# Patient Record
Sex: FEMALE | Race: WHITE | NOT HISPANIC OR LATINO | Employment: OTHER | URBAN - METROPOLITAN AREA
[De-identification: names, ages, dates, MRNs, and addresses within clinical notes are randomized per-mention and may not be internally consistent; named-entity substitution may affect disease eponyms.]

---

## 2019-05-09 ENCOUNTER — HOSPITAL ENCOUNTER (OUTPATIENT)
Dept: RADIATION ONCOLOGY | Facility: HOSPITAL | Age: 84
Setting detail: RECURRING SERIES
Discharge: HOME | End: 2019-05-31
Attending: RADIOLOGY

## 2019-07-22 ENCOUNTER — HOSPITAL ENCOUNTER (OUTPATIENT)
Dept: RADIATION ONCOLOGY | Facility: HOSPITAL | Age: 84
Setting detail: RECURRING SERIES
Discharge: HOME | End: 2019-07-31
Attending: RADIOLOGY

## 2019-08-01 ENCOUNTER — HOSPITAL ENCOUNTER (OUTPATIENT)
Dept: RADIATION ONCOLOGY | Facility: HOSPITAL | Age: 84
Setting detail: RECURRING SERIES
Discharge: HOME | End: 2019-08-31
Attending: RADIOLOGY

## 2019-09-03 ENCOUNTER — HOSPITAL ENCOUNTER (OUTPATIENT)
Dept: RADIATION ONCOLOGY | Facility: HOSPITAL | Age: 84
Setting detail: RECURRING SERIES
Discharge: HOME | End: 2019-09-30
Attending: RADIOLOGY

## 2020-03-10 ENCOUNTER — HOSPITAL ENCOUNTER (OUTPATIENT)
Dept: RADIATION ONCOLOGY | Facility: HOSPITAL | Age: 85
Discharge: HOME | End: 2020-03-10
Attending: RADIOLOGY

## 2022-08-17 ENCOUNTER — TRANSCRIBE ORDERS (OUTPATIENT)
Dept: ADMINISTRATIVE | Facility: HOSPITAL | Age: 87
End: 2022-08-17

## 2022-08-17 DIAGNOSIS — M81.0 OSTEOPOROSIS OF MULTIPLE SITES: Primary | ICD-10-CM

## 2022-08-22 ENCOUNTER — APPOINTMENT (OUTPATIENT)
Dept: BONE DENSITY | Facility: HOSPITAL | Age: 87
End: 2022-08-22

## 2022-08-24 ENCOUNTER — HOSPITAL ENCOUNTER (OUTPATIENT)
Dept: BONE DENSITY | Facility: HOSPITAL | Age: 87
Discharge: HOME | End: 2022-08-24
Admitting: INTERNAL MEDICINE

## 2022-08-24 DIAGNOSIS — M81.0 OSTEOPOROSIS OF MULTIPLE SITES: ICD-10-CM

## 2022-08-24 PROCEDURE — 77080 DXA BONE DENSITY AXIAL: CPT

## 2022-09-09 ENCOUNTER — APPOINTMENT (OUTPATIENT)
Dept: CT IMAGING | Facility: HOSPITAL | Age: 87
End: 2022-09-09

## 2022-09-09 ENCOUNTER — HOSPITAL ENCOUNTER (EMERGENCY)
Facility: HOSPITAL | Age: 87
Discharge: ANOTHER HOSPITAL | End: 2022-09-09
Attending: STUDENT IN AN ORGANIZED HEALTH CARE EDUCATION/TRAINING PROGRAM | Admitting: STUDENT IN AN ORGANIZED HEALTH CARE EDUCATION/TRAINING PROGRAM

## 2022-09-09 VITALS
SYSTOLIC BLOOD PRESSURE: 142 MMHG | HEIGHT: 62 IN | WEIGHT: 195.77 LBS | OXYGEN SATURATION: 93 % | HEART RATE: 71 BPM | TEMPERATURE: 98.9 F | DIASTOLIC BLOOD PRESSURE: 70 MMHG | BODY MASS INDEX: 36.03 KG/M2 | RESPIRATION RATE: 18 BRPM

## 2022-09-09 DIAGNOSIS — H04.301 DACROCYSTITIS, RIGHT: Primary | ICD-10-CM

## 2022-09-09 DIAGNOSIS — H54.7 VISION PROBLEM: ICD-10-CM

## 2022-09-09 DIAGNOSIS — L03.213 PRESEPTAL CELLULITIS OF RIGHT EYE: ICD-10-CM

## 2022-09-09 LAB
ALBUMIN SERPL-MCNC: 3.2 G/DL (ref 3.5–5.2)
ALBUMIN/GLOB SERPL: 0.8 G/DL
ALP SERPL-CCNC: 119 U/L (ref 39–117)
ALT SERPL W P-5'-P-CCNC: 8 U/L (ref 1–33)
ANION GAP SERPL CALCULATED.3IONS-SCNC: 9.7 MMOL/L (ref 5–15)
AST SERPL-CCNC: 10 U/L (ref 1–32)
BASOPHILS # BLD AUTO: 0.08 10*3/MM3 (ref 0–0.2)
BASOPHILS NFR BLD AUTO: 0.7 % (ref 0–1.5)
BILIRUB SERPL-MCNC: 0.6 MG/DL (ref 0–1.2)
BUN SERPL-MCNC: 15 MG/DL (ref 8–23)
BUN/CREAT SERPL: 27.8 (ref 7–25)
CALCIUM SPEC-SCNC: 9.2 MG/DL (ref 8.2–9.6)
CHLORIDE SERPL-SCNC: 95 MMOL/L (ref 98–107)
CO2 SERPL-SCNC: 28.3 MMOL/L (ref 22–29)
CREAT SERPL-MCNC: 0.54 MG/DL (ref 0.57–1)
D-LACTATE SERPL-SCNC: 1 MMOL/L (ref 0.5–2)
DEPRECATED RDW RBC AUTO: 45.2 FL (ref 37–54)
EGFRCR SERPLBLD CKD-EPI 2021: 87 ML/MIN/1.73
EOSINOPHIL # BLD AUTO: 0.4 10*3/MM3 (ref 0–0.4)
EOSINOPHIL NFR BLD AUTO: 3.5 % (ref 0.3–6.2)
ERYTHROCYTE [DISTWIDTH] IN BLOOD BY AUTOMATED COUNT: 14.4 % (ref 12.3–15.4)
GLOBULIN UR ELPH-MCNC: 3.9 GM/DL
GLUCOSE SERPL-MCNC: 155 MG/DL (ref 65–99)
HCT VFR BLD AUTO: 40.1 % (ref 34–46.6)
HGB BLD-MCNC: 13.4 G/DL (ref 12–15.9)
IMM GRANULOCYTES # BLD AUTO: 0.05 10*3/MM3 (ref 0–0.05)
IMM GRANULOCYTES NFR BLD AUTO: 0.4 % (ref 0–0.5)
LYMPHOCYTES # BLD AUTO: 2.06 10*3/MM3 (ref 0.7–3.1)
LYMPHOCYTES NFR BLD AUTO: 17.8 % (ref 19.6–45.3)
MCH RBC QN AUTO: 28.8 PG (ref 26.6–33)
MCHC RBC AUTO-ENTMCNC: 33.4 G/DL (ref 31.5–35.7)
MCV RBC AUTO: 86.1 FL (ref 79–97)
MONOCYTES # BLD AUTO: 1.04 10*3/MM3 (ref 0.1–0.9)
MONOCYTES NFR BLD AUTO: 9 % (ref 5–12)
NEUTROPHILS NFR BLD AUTO: 68.6 % (ref 42.7–76)
NEUTROPHILS NFR BLD AUTO: 7.92 10*3/MM3 (ref 1.7–7)
NRBC BLD AUTO-RTO: 0 /100 WBC (ref 0–0.2)
PLATELET # BLD AUTO: 269 10*3/MM3 (ref 140–450)
PMV BLD AUTO: 9.4 FL (ref 6–12)
POTASSIUM SERPL-SCNC: 3.6 MMOL/L (ref 3.5–5.2)
PROT SERPL-MCNC: 7.1 G/DL (ref 6–8.5)
RBC # BLD AUTO: 4.66 10*6/MM3 (ref 3.77–5.28)
SODIUM SERPL-SCNC: 133 MMOL/L (ref 136–145)
WBC NRBC COR # BLD: 11.55 10*3/MM3 (ref 3.4–10.8)

## 2022-09-09 PROCEDURE — 99284 EMERGENCY DEPT VISIT MOD MDM: CPT

## 2022-09-09 PROCEDURE — 87186 SC STD MICRODIL/AGAR DIL: CPT | Performed by: STUDENT IN AN ORGANIZED HEALTH CARE EDUCATION/TRAINING PROGRAM

## 2022-09-09 PROCEDURE — 80053 COMPREHEN METABOLIC PANEL: CPT | Performed by: STUDENT IN AN ORGANIZED HEALTH CARE EDUCATION/TRAINING PROGRAM

## 2022-09-09 PROCEDURE — 0 IOPAMIDOL PER 1 ML: Performed by: STUDENT IN AN ORGANIZED HEALTH CARE EDUCATION/TRAINING PROGRAM

## 2022-09-09 PROCEDURE — 87147 CULTURE TYPE IMMUNOLOGIC: CPT | Performed by: STUDENT IN AN ORGANIZED HEALTH CARE EDUCATION/TRAINING PROGRAM

## 2022-09-09 PROCEDURE — 96365 THER/PROPH/DIAG IV INF INIT: CPT

## 2022-09-09 PROCEDURE — 83605 ASSAY OF LACTIC ACID: CPT | Performed by: STUDENT IN AN ORGANIZED HEALTH CARE EDUCATION/TRAINING PROGRAM

## 2022-09-09 PROCEDURE — 87040 BLOOD CULTURE FOR BACTERIA: CPT | Performed by: STUDENT IN AN ORGANIZED HEALTH CARE EDUCATION/TRAINING PROGRAM

## 2022-09-09 PROCEDURE — 87205 SMEAR GRAM STAIN: CPT | Performed by: STUDENT IN AN ORGANIZED HEALTH CARE EDUCATION/TRAINING PROGRAM

## 2022-09-09 PROCEDURE — 96366 THER/PROPH/DIAG IV INF ADDON: CPT

## 2022-09-09 PROCEDURE — 85025 COMPLETE CBC W/AUTO DIFF WBC: CPT | Performed by: STUDENT IN AN ORGANIZED HEALTH CARE EDUCATION/TRAINING PROGRAM

## 2022-09-09 PROCEDURE — 87070 CULTURE OTHR SPECIMN AEROBIC: CPT | Performed by: STUDENT IN AN ORGANIZED HEALTH CARE EDUCATION/TRAINING PROGRAM

## 2022-09-09 PROCEDURE — 25010000002 VANCOMYCIN 5 G RECONSTITUTED SOLUTION: Performed by: STUDENT IN AN ORGANIZED HEALTH CARE EDUCATION/TRAINING PROGRAM

## 2022-09-09 PROCEDURE — 87077 CULTURE AEROBIC IDENTIFY: CPT | Performed by: STUDENT IN AN ORGANIZED HEALTH CARE EDUCATION/TRAINING PROGRAM

## 2022-09-09 PROCEDURE — 25010000002 PIPERACILLIN SOD-TAZOBACTAM PER 1 G: Performed by: STUDENT IN AN ORGANIZED HEALTH CARE EDUCATION/TRAINING PROGRAM

## 2022-09-09 PROCEDURE — 96367 TX/PROPH/DG ADDL SEQ IV INF: CPT

## 2022-09-09 PROCEDURE — 36415 COLL VENOUS BLD VENIPUNCTURE: CPT

## 2022-09-09 PROCEDURE — 70481 CT ORBIT/EAR/FOSSA W/DYE: CPT

## 2022-09-09 RX ORDER — FUROSEMIDE 40 MG/1
40 TABLET ORAL 2 TIMES DAILY
COMMUNITY

## 2022-09-09 RX ORDER — POLYETHYLENE GLYCOL 3350 17 G/17G
17 POWDER, FOR SOLUTION ORAL DAILY
COMMUNITY

## 2022-09-09 RX ORDER — MULTIVIT WITH MINERALS/LUTEIN
500 TABLET ORAL DAILY
Status: ON HOLD | COMMUNITY
End: 2022-10-13

## 2022-09-09 RX ORDER — FLUCONAZOLE 200 MG/1
200 TABLET ORAL DAILY
Status: ON HOLD | COMMUNITY
End: 2022-10-13

## 2022-09-09 RX ORDER — NYSTATIN 100000 [USP'U]/G
1 POWDER TOPICAL 4 TIMES DAILY
COMMUNITY

## 2022-09-09 RX ORDER — LORATADINE 10 MG/1
10 CAPSULE, LIQUID FILLED ORAL DAILY
COMMUNITY

## 2022-09-09 RX ORDER — ASPIRIN 81 MG/1
81 TABLET, CHEWABLE ORAL DAILY
Status: ON HOLD | COMMUNITY
End: 2022-10-13

## 2022-09-09 RX ORDER — ERYTHROMYCIN 5 MG/G
1 OINTMENT OPHTHALMIC ONCE
Status: COMPLETED | OUTPATIENT
Start: 2022-09-09 | End: 2022-09-09

## 2022-09-09 RX ORDER — LANOLIN ALCOHOL/MO/W.PET/CERES
2000 CREAM (GRAM) TOPICAL DAILY
Status: ON HOLD | COMMUNITY
End: 2022-10-13

## 2022-09-09 RX ORDER — DOCUSATE SODIUM 100 MG/1
100 CAPSULE, LIQUID FILLED ORAL 2 TIMES DAILY
COMMUNITY

## 2022-09-09 RX ORDER — BROMPHENIRAMINE MALEATE, PSEUDOEPHEDRINE HYDROCHLORIDE, AND DEXTROMETHORPHAN HYDROBROMIDE 2; 30; 10 MG/5ML; MG/5ML; MG/5ML
SYRUP ORAL 4 TIMES DAILY PRN
COMMUNITY
End: 2023-03-05

## 2022-09-09 RX ORDER — SIMETHICONE 125 MG
125 TABLET,CHEWABLE ORAL EVERY 6 HOURS PRN
COMMUNITY

## 2022-09-09 RX ORDER — IBUPROFEN 200 MG
1 TABLET ORAL 4 TIMES DAILY
COMMUNITY

## 2022-09-09 RX ORDER — SENNOSIDES 8.6 MG
650 CAPSULE ORAL EVERY 8 HOURS PRN
COMMUNITY

## 2022-09-09 RX ORDER — CLOTRIMAZOLE 1 %
1 CREAM (GRAM) TOPICAL 2 TIMES DAILY
COMMUNITY

## 2022-09-09 RX ORDER — IPRATROPIUM BROMIDE AND ALBUTEROL SULFATE 2.5; .5 MG/3ML; MG/3ML
3 SOLUTION RESPIRATORY (INHALATION) EVERY 4 HOURS PRN
COMMUNITY

## 2022-09-09 RX ORDER — BISACODYL 5 MG/1
5 TABLET, DELAYED RELEASE ORAL DAILY PRN
COMMUNITY

## 2022-09-09 RX ORDER — ATORVASTATIN CALCIUM 40 MG/1
40 TABLET, FILM COATED ORAL DAILY
COMMUNITY

## 2022-09-09 RX ORDER — DIPHENHYDRAMINE HCL 25 MG
25 CAPSULE ORAL EVERY 6 HOURS PRN
COMMUNITY

## 2022-09-09 RX ORDER — METOPROLOL SUCCINATE 100 MG/1
100 TABLET, EXTENDED RELEASE ORAL DAILY
Status: ON HOLD | COMMUNITY
End: 2022-10-13

## 2022-09-09 RX ADMIN — ERYTHROMYCIN 1 APPLICATION: 5 OINTMENT OPHTHALMIC at 18:56

## 2022-09-09 RX ADMIN — TAZOBACTAM SODIUM AND PIPERACILLIN SODIUM 3.38 G: 375; 3 INJECTION, SOLUTION INTRAVENOUS at 17:32

## 2022-09-09 RX ADMIN — VANCOMYCIN HYDROCHLORIDE 1750 MG: 5 INJECTION, POWDER, LYOPHILIZED, FOR SOLUTION INTRAVENOUS at 18:52

## 2022-09-09 RX ADMIN — IOPAMIDOL 100 ML: 755 INJECTION, SOLUTION INTRAVENOUS at 18:41

## 2022-09-09 NOTE — ED PROVIDER NOTES
Time: 4:33 PM EDT  Arrived by: ambulance  Chief Complaint: Eye drainage, swelling  History provided by: Patient  History is limited by: N/A     History of Present Illness:  Patient is a 89 y.o. year old female who presents to the emergency department with right eye swelling, drainage, and erythema that has worsened over the last few days and began 3-4 days ago. Patient was sent from [facility]. Patient has a history of diabetes, CHF, and sick sinus syndrome. Patient has been receiving an antibiotic ointment for her right eye. Patient states her vision is bothering her in her right eye. Patient denies fever or vomiting.       History provided by:  Patient      Similar Symptoms Previously: No  Recently seen: Yes      Patient Care Team  Primary Care Provider: Thomas Augustin MD    Past Medical History:     Allergies   Allergen Reactions   • Adhesive Tape Rash     Past Medical History:   Diagnosis Date   • Arthritis    • CAD (coronary artery disease)    • CHF (congestive heart failure) (HCC)    • Diabetes mellitus (HCC)    • GERD (gastroesophageal reflux disease)    • Hyperlipidemia    • Hypertension    • Pneumonia    • Sick sinus syndrome (HCC)      Past Surgical History:   Procedure Laterality Date   • CARDIAC DEFIBRILLATOR PLACEMENT     • MASTECTOMY Left    • REPLACEMENT TOTAL KNEE Bilateral      History reviewed. No pertinent family history.    Home Medications:  Prior to Admission medications    Medication Sig Start Date End Date Taking? Authorizing Provider   acetaminophen (TYLENOL) 650 MG 8 hr tablet Take 650 mg by mouth Every 8 (Eight) Hours As Needed for Mild Pain.    Carley Pickett MD   apixaban (ELIQUIS) 2.5 MG tablet tablet Take 2.5 mg by mouth 2 (Two) Times a Day.    Carley Pickett MD   aspirin 81 MG chewable tablet Chew 81 mg Daily.    Carley Pickett MD   atorvastatin (LIPITOR) 40 MG tablet Take 40 mg by mouth Daily.    Carley Pickett MD   bisacodyl  (DULCOLAX) 5 MG EC tablet Take 5 mg by mouth Daily As Needed for Constipation.    Carley Pickett MD   brompheniramine-pseudoephedrine-DM (Bromfed DM) 30-2-10 MG/5ML syrup Take  by mouth 4 (Four) Times a Day As Needed for Allergies.    Carley Pickett MD   clotrimazole (LOTRIMIN) 1 % cream Apply 1 application topically to the appropriate area as directed 2 (Two) Times a Day.    Carley Pickett MD   diphenhydrAMINE (BENADRYL) 25 mg capsule Take 25 mg by mouth Every 6 (Six) Hours As Needed for Itching.    Carley Pickett MD   docusate sodium (COLACE) 100 MG capsule Take 100 mg by mouth 2 (Two) Times a Day.    Carley Pickett MD   fluconazole (DIFLUCAN) 200 MG tablet Take 200 mg by mouth Daily.    Carley Pickett MD   furosemide (LASIX) 40 MG tablet Take 40 mg by mouth 2 (Two) Times a Day.    Carley Pickett MD   ipratropium-albuterol (DUO-NEB) 0.5-2.5 mg/3 ml nebulizer Take 3 mL by nebulization Every 4 (Four) Hours As Needed for Wheezing.    Carley Pickett MD   lactobacillus (BACID) tablet caplet Take  by mouth 2 (Two) Times a Day.    Carley Pickett MD   Loratadine (Claritin) 10 MG capsule Take  by mouth.    Carley Pickett MD   metoprolol succinate XL (TOPROL-XL) 100 MG 24 hr tablet Take 100 mg by mouth Daily.    Carley Pickett MD   neomycin-bacitracin-polymyxin (NEOSPORIN) 5-400-5000 ointment Apply 1 application topically to the appropriate area as directed 4 (Four) Times a Day.    Carley Pickett MD   nystatin (MYCOSTATIN) 873181 UNIT/GM powder Apply  topically to the appropriate area as directed 4 (Four) Times a Day.    Carley Pickett MD   polyethylene glycol (MiraLax) 17 g packet Take 17 g by mouth Daily.    Carley Pickett MD   simethicone (MYLICON) 125 MG chewable tablet Chew 125 mg Every 6 (Six) Hours As Needed for Flatulence.    Carley iPckett MD   tobramycin (TOBREX) 0.3 % ointment ophthalmic ointment Every 6 (Six)  "Hours.    ProviderCarley MD   vitamin B-12 (CYANOCOBALAMIN) 1000 MCG tablet Take 2,000 mcg by mouth Daily.    ProviderCarley MD   vitamin C (ASCORBIC ACID) 250 MG tablet Take 500 mg by mouth Daily.    ProviderCarley MD        Social History:   Social History     Tobacco Use   • Smoking status: Never Smoker   • Smokeless tobacco: Never Used   Substance Use Topics   • Alcohol use: Not Currently   • Drug use: Not Currently       Review of Systems:  Review of Systems   Constitutional: Negative for chills and fever.   HENT: Negative for congestion, rhinorrhea and sore throat.    Eyes: Positive for discharge, redness and visual disturbance. Negative for pain.        Right eye swelling   Respiratory: Negative for apnea, cough, chest tightness and shortness of breath.    Cardiovascular: Negative for chest pain and palpitations.   Gastrointestinal: Negative for abdominal pain, diarrhea, nausea and vomiting.   Genitourinary: Negative for difficulty urinating and dysuria.   Musculoskeletal: Negative for joint swelling and myalgias.   Skin: Negative for color change.   Neurological: Negative for seizures and headaches.   Psychiatric/Behavioral: Negative.    All other systems reviewed and are negative.       Physical Exam:  /70   Pulse 71   Temp 98.9 °F (37.2 °C) (Oral)   Resp 18   Ht 157.5 cm (62\")   Wt 88.8 kg (195 lb 12.3 oz)   SpO2 93%   BMI 35.81 kg/m²     Physical Exam  Vitals and nursing note reviewed.   Constitutional:       General: She is not in acute distress.     Appearance: Normal appearance. She is not toxic-appearing.   HENT:      Head: Normocephalic and atraumatic.      Jaw: There is normal jaw occlusion.   Eyes:      General: Lids are normal.      Extraocular Movements: Extraocular movements intact.      Conjunctiva/sclera: Conjunctivae normal.      Pupils: Pupils are equal, round, and reactive to light.      Comments: Bilateral cellulitis around her eye. Right eye has " significant purulent drainage. Patient has an area of fluctuance.     Cardiovascular:      Rate and Rhythm: Normal rate and regular rhythm.      Pulses: Normal pulses.      Heart sounds: Normal heart sounds.   Pulmonary:      Effort: Pulmonary effort is normal. No respiratory distress.      Breath sounds: Normal breath sounds. No wheezing or rhonchi.   Abdominal:      General: Abdomen is flat.      Palpations: Abdomen is soft.      Tenderness: There is no abdominal tenderness. There is no guarding or rebound.   Musculoskeletal:         General: Normal range of motion.      Cervical back: Normal range of motion and neck supple.      Right lower leg: No edema.      Left lower leg: No edema.   Skin:     General: Skin is warm and dry.   Neurological:      Mental Status: She is alert and oriented to person, place, and time. Mental status is at baseline.   Psychiatric:         Mood and Affect: Mood normal.                Medications in the Emergency Department:  Medications   erythromycin (ROMYCIN) ophthalmic ointment 1 application (1 application Both Eyes Given 9/9/22 1856)   vancomycin 1750 mg/500 mL 0.9% NS IVPB ([facility]) (0 mg Intravenous Stopped 9/9/22 2133)   piperacillin-tazobactam (ZOSYN) 3.375 g in iso-osmotic dextrose 50 ml (premix) (0 g Intravenous Stopped 9/9/22 1818)   iopamidol (ISOVUE-370) 76 % injection 100 mL (100 mL Intravenous Given 9/9/22 1841)        Labs  Lab Results (last 24 hours)     Procedure Component Value Units Date/Time    CBC & Differential [[id number]]  (Abnormal) Collected: 09/09/22 1724    Specimen: Blood Updated: 09/09/22 1733    Narrative:      The following orders were created for panel order CBC & Differential.  Procedure                               Abnormality         Status                     ---------                               -----------         ------                     CBC Auto Differential[[id number]]        Abnormal            Final result                 Please view results  for these tests on the individual orders.    Comprehensive Metabolic Panel [[id number]]  (Abnormal) Collected: 09/09/22 1724    Specimen: Blood Updated: 09/09/22 1759     Glucose 155 mg/dL      BUN 15 mg/dL      Creatinine 0.54 mg/dL      Sodium 133 mmol/L      Potassium 3.6 mmol/L      Chloride 95 mmol/L      CO2 28.3 mmol/L      Calcium 9.2 mg/dL      Total Protein 7.1 g/dL      Albumin 3.20 g/dL      ALT (SGPT) 8 U/L      AST (SGOT) 10 U/L      Alkaline Phosphatase 119 U/L      Total Bilirubin 0.6 mg/dL      Globulin 3.9 gm/dL      A/G Ratio 0.8 g/dL      BUN/Creatinine Ratio 27.8     Anion Gap 9.7 mmol/L      eGFR 87.0 mL/min/1.73      Comment: National Kidney Foundation and American Society of Nephrology (ASN) Task Force recommended calculation based on the Chronic Kidney Disease Epidemiology Collaboration (CKD-EPI) equation refit without adjustment for race.       Narrative:      GFR Normal >60  Chronic Kidney Disease <60  Kidney Failure <15      Blood Culture - Blood, Arm, Right [[id number]] Collected: 09/09/22 1724    Specimen: Blood from Arm, Right Updated: 09/09/22 1728    Blood Culture - Blood, Arm, Left [[id number]] Collected: 09/09/22 1724    Specimen: Blood from Arm, Left Updated: 09/09/22 1728    Lactic Acid, Plasma [[id number]]  (Normal) Collected: 09/09/22 1724    Specimen: Blood Updated: 09/09/22 1757     Lactate 1.0 mmol/L     CBC Auto Differential [[id number]]  (Abnormal) Collected: 09/09/22 1724    Specimen: Blood Updated: 09/09/22 1733     WBC 11.55 10*3/mm3      RBC 4.66 10*6/mm3      Hemoglobin 13.4 g/dL      Hematocrit 40.1 %      MCV 86.1 fL      MCH 28.8 pg      MCHC 33.4 g/dL      RDW 14.4 %      RDW-SD 45.2 fl      MPV 9.4 fL      Platelets 269 10*3/mm3      Neutrophil % 68.6 %      Lymphocyte % 17.8 %      Monocyte % 9.0 %      Eosinophil % 3.5 %      Basophil % 0.7 %      Immature Grans % 0.4 %      Neutrophils, Absolute 7.92 10*3/mm3      Lymphocytes, Absolute 2.06 10*3/mm3      Monocytes,  Absolute 1.04 10*3/mm3      Eosinophils, Absolute 0.40 10*3/mm3      Basophils, Absolute 0.08 10*3/mm3      Immature Grans, Absolute 0.05 10*3/mm3      nRBC 0.0 /100 WBC     Wound Culture - Wound, Eye, Right [[id number]] Collected: 09/09/22 1813    Specimen: Wound from Eye, Right Updated: 09/09/22 1844     Gram Stain Rare (1+) Gram positive bacilli resembling diphtheroids           Imaging:  CT Orbits With Contrast    Result Date: 9/9/2022  PROCEDURE: CT ORBITS W CONTRAST  COMPARISON: None  INDICATIONS: severe facial infection  PROTOCOL:   Standard imaging protocol performed    RADIATION:   DLP: 471.7mGy*cm   Automated exposure control was utilized to minimize radiation dose. CONTRAST: 100cc Isovue 370 I.V. LABS:   eGFR: 85ml/min/1.73m2  TECHNIQUE: After obtaining the patient's consent, Multi-planar CT images were created with intravenous contrast.   FINDINGS:  Mastoid air cells are clear.  Mild ethmoid sinus disease.  Osteopenic change.  The okay the the the the the the no obvious osseous erosions are seen.  Previous cataract extraction.  There is no obvious intraconal inflammatory stranding.  There may be some preseptal edema.  There is multiple nodules seen in the parotid glands greater on the right that could be lymph nodes.  There are multiple cervical lymph nodes more numerous on the left.  1.3 centimeter submandibular lymph node is seen.  1.3 centimeter right submandibular lymph node is seen.  There is soft tissue in the right tonsillar region.  There is a 1.7 x 2.1 centimeter fluid collection adjacent to the medial right eye extending slightly inferiorly.  There may be a smaller fluid collection on the left measuring 0.9 centimeters.        1. There may be preseptal cellulitis.  There is a 1.7 x 2.1 centimeter fluid density collection medial to the right globe in the region of the nasolacrimal sac that may reflect dacrocystits.  Superimposed infected fluid collection is possible.  There is a smaller similar  structure on the left measuring 0.9 centimeters.  Previous cataract extraction. 2. Prominent upper cervical lymph nodes greater in the submandibular region may be reactive.  There is increased number of lymph nodes in the neck especially the left.  Recommend continued follow-up. 3. Not mentioned above there is prominence of the right tonsil.  There is fluid density in the region of the left piriform sinus which may reflect secretions.  Correlate for any symptom.  This is incompletely visualized.     IAM PADILLA MD       Electronically Signed and Approved By: IAM PADILLA MD on 9/09/2022 at 20:06               Procedures:  Procedures    Progress  ED Course as of 09/09/22 2225   Fri Sep 09, 2022   2110 Patient accepted by ophthalmology Dr. Mcdowell. [LQ]      ED Course User Index  [LQ] Irma López MD                            Medical Decision Making:  MDM  Number of Diagnoses or Management Options  Dacrocystitis, right  Preseptal cellulitis of right eye  Vision problem  Diagnosis management comments: Patient's vision in her right eye is 20/100 on the left is 20/50.  She has thick purulent drainage from the right eye.  She also appears to have dacryocystitis and preseptal cellulitis over the right eye and cellulitis over the left eye as well.  Patient was started on Vanco and Zosyn.  I did speak with ophthalmology at [facility] as I have concerns due to her decreased vision and the significant swelling of her right eye.  Patient was accepted for ED to ED transfer.       Amount and/or Complexity of Data Reviewed  Clinical lab tests: reviewed  Tests in the radiology section of CPT®: reviewed  Review and summarize past medical records: yes  Discuss the patient with other providers: yes  Independent visualization of images, tracings, or specimens: yes         Final diagnoses:   Dacrocystitis, right   Preseptal cellulitis of right eye   Vision problem        Disposition:  ED Disposition     ED Disposition    Transfer to Another Facility     Condition   --    Comment   --             This medical record created using voice recognition software.      Documentation assistance provided by Ariela Chávez acting as scribe for Dr. Irma López MD. Information recorded by the scribe was done at my direction and has been verified and validated by me.        Ariela Chávez  09/09/22 5801       Irma López MD  09/09/22 0678

## 2022-09-11 ENCOUNTER — TELEPHONE (OUTPATIENT)
Dept: EMERGENCY DEPT | Facility: HOSPITAL | Age: 87
End: 2022-09-11

## 2022-09-12 LAB
BACTERIA SPEC AEROBE CULT: ABNORMAL
BACTERIA SPEC AEROBE CULT: ABNORMAL
GRAM STN SPEC: ABNORMAL

## 2022-09-14 LAB
BACTERIA SPEC AEROBE CULT: NORMAL
BACTERIA SPEC AEROBE CULT: NORMAL

## 2022-10-13 ENCOUNTER — ANESTHESIA (OUTPATIENT)
Dept: PERIOP | Facility: HOSPITAL | Age: 87
End: 2022-10-13

## 2022-10-13 ENCOUNTER — ANESTHESIA EVENT (OUTPATIENT)
Dept: PERIOP | Facility: HOSPITAL | Age: 87
End: 2022-10-13

## 2022-10-13 ENCOUNTER — HOSPITAL ENCOUNTER (OUTPATIENT)
Facility: HOSPITAL | Age: 87
Setting detail: HOSPITAL OUTPATIENT SURGERY
Discharge: HOME | End: 2022-10-13
Attending: OPHTHALMOLOGY | Admitting: OPHTHALMOLOGY

## 2022-10-13 VITALS
HEART RATE: 95 BPM | WEIGHT: 182 LBS | OXYGEN SATURATION: 96 % | DIASTOLIC BLOOD PRESSURE: 82 MMHG | SYSTOLIC BLOOD PRESSURE: 131 MMHG | BODY MASS INDEX: 33.49 KG/M2 | RESPIRATION RATE: 16 BRPM | TEMPERATURE: 97.8 F | HEIGHT: 62 IN

## 2022-10-13 LAB
GLUCOSE BLDC GLUCOMTR-MCNC: 135 MG/DL (ref 70–130)
GLUCOSE BLDC GLUCOMTR-MCNC: 144 MG/DL (ref 70–130)

## 2022-10-13 PROCEDURE — 25010000002 PROPOFOL 10 MG/ML EMULSION: Performed by: NURSE ANESTHETIST, CERTIFIED REGISTERED

## 2022-10-13 PROCEDURE — A4263 PERMANENT TEAR DUCT PLUG: HCPCS | Performed by: OPHTHALMOLOGY

## 2022-10-13 PROCEDURE — 25010000002 CEFAZOLIN IN DEXTROSE 2-4 GM/100ML-% SOLUTION: Performed by: OPHTHALMOLOGY

## 2022-10-13 PROCEDURE — 25010000002 ONDANSETRON PER 1 MG: Performed by: NURSE ANESTHETIST, CERTIFIED REGISTERED

## 2022-10-13 PROCEDURE — 82962 GLUCOSE BLOOD TEST: CPT

## 2022-10-13 DEVICE — LACRIMAL INTUBATION SET CRAWFORD
Type: IMPLANTABLE DEVICE | Site: EYE | Status: FUNCTIONAL
Brand: CRAWFORD

## 2022-10-13 RX ORDER — FENTANYL CITRATE 50 UG/ML
50 INJECTION, SOLUTION INTRAMUSCULAR; INTRAVENOUS
Status: DISCONTINUED | OUTPATIENT
Start: 2022-10-13 | End: 2022-10-13 | Stop reason: HOSPADM

## 2022-10-13 RX ORDER — HYDRALAZINE HYDROCHLORIDE 20 MG/ML
5 INJECTION INTRAMUSCULAR; INTRAVENOUS
Status: DISCONTINUED | OUTPATIENT
Start: 2022-10-13 | End: 2022-10-13 | Stop reason: HOSPADM

## 2022-10-13 RX ORDER — LEVOTHYROXINE SODIUM 0.15 MG/1
150 TABLET ORAL DAILY
COMMUNITY

## 2022-10-13 RX ORDER — PROMETHAZINE HYDROCHLORIDE 25 MG/1
25 TABLET ORAL ONCE AS NEEDED
Status: DISCONTINUED | OUTPATIENT
Start: 2022-10-13 | End: 2022-10-13 | Stop reason: HOSPADM

## 2022-10-13 RX ORDER — DIPHENHYDRAMINE HCL 25 MG
25 CAPSULE ORAL
Status: DISCONTINUED | OUTPATIENT
Start: 2022-10-13 | End: 2022-10-13 | Stop reason: HOSPADM

## 2022-10-13 RX ORDER — ONDANSETRON 2 MG/ML
INJECTION INTRAMUSCULAR; INTRAVENOUS AS NEEDED
Status: DISCONTINUED | OUTPATIENT
Start: 2022-10-13 | End: 2022-10-13 | Stop reason: SURG

## 2022-10-13 RX ORDER — PROPOFOL 10 MG/ML
VIAL (ML) INTRAVENOUS AS NEEDED
Status: DISCONTINUED | OUTPATIENT
Start: 2022-10-13 | End: 2022-10-13 | Stop reason: SURG

## 2022-10-13 RX ORDER — HYDROMORPHONE HYDROCHLORIDE 1 MG/ML
0.5 INJECTION, SOLUTION INTRAMUSCULAR; INTRAVENOUS; SUBCUTANEOUS
Status: DISCONTINUED | OUTPATIENT
Start: 2022-10-13 | End: 2022-10-13 | Stop reason: HOSPADM

## 2022-10-13 RX ORDER — SODIUM CHLORIDE 0.9 % (FLUSH) 0.9 %
3-10 SYRINGE (ML) INJECTION AS NEEDED
Status: DISCONTINUED | OUTPATIENT
Start: 2022-10-13 | End: 2022-10-13 | Stop reason: HOSPADM

## 2022-10-13 RX ORDER — EPHEDRINE SULFATE 50 MG/ML
5 INJECTION, SOLUTION INTRAVENOUS ONCE AS NEEDED
Status: DISCONTINUED | OUTPATIENT
Start: 2022-10-13 | End: 2022-10-13 | Stop reason: HOSPADM

## 2022-10-13 RX ORDER — LABETALOL HYDROCHLORIDE 5 MG/ML
5 INJECTION, SOLUTION INTRAVENOUS
Status: DISCONTINUED | OUTPATIENT
Start: 2022-10-13 | End: 2022-10-13 | Stop reason: HOSPADM

## 2022-10-13 RX ORDER — HYDROCODONE BITARTRATE AND ACETAMINOPHEN 7.5; 325 MG/1; MG/1
1 TABLET ORAL ONCE AS NEEDED
Status: DISCONTINUED | OUTPATIENT
Start: 2022-10-13 | End: 2022-10-13 | Stop reason: HOSPADM

## 2022-10-13 RX ORDER — PROMETHAZINE HYDROCHLORIDE 25 MG/1
25 SUPPOSITORY RECTAL ONCE AS NEEDED
Status: DISCONTINUED | OUTPATIENT
Start: 2022-10-13 | End: 2022-10-13 | Stop reason: HOSPADM

## 2022-10-13 RX ORDER — ERYTHROMYCIN 5 MG/G
OINTMENT OPHTHALMIC 2 TIMES DAILY
Qty: 3.5 G | Refills: 1 | Status: SHIPPED | OUTPATIENT
Start: 2022-10-13 | End: 2023-03-05

## 2022-10-13 RX ORDER — FLUMAZENIL 0.1 MG/ML
0.2 INJECTION INTRAVENOUS AS NEEDED
Status: DISCONTINUED | OUTPATIENT
Start: 2022-10-13 | End: 2022-10-13 | Stop reason: HOSPADM

## 2022-10-13 RX ORDER — NALOXONE HCL 0.4 MG/ML
0.2 VIAL (ML) INJECTION AS NEEDED
Status: DISCONTINUED | OUTPATIENT
Start: 2022-10-13 | End: 2022-10-13 | Stop reason: HOSPADM

## 2022-10-13 RX ORDER — FAMOTIDINE 10 MG/ML
20 INJECTION, SOLUTION INTRAVENOUS ONCE
Status: COMPLETED | OUTPATIENT
Start: 2022-10-13 | End: 2022-10-13

## 2022-10-13 RX ORDER — SODIUM CHLORIDE, SODIUM LACTATE, POTASSIUM CHLORIDE, CALCIUM CHLORIDE 600; 310; 30; 20 MG/100ML; MG/100ML; MG/100ML; MG/100ML
INJECTION, SOLUTION INTRAVENOUS CONTINUOUS PRN
Status: DISCONTINUED | OUTPATIENT
Start: 2022-10-13 | End: 2022-10-13 | Stop reason: SURG

## 2022-10-13 RX ORDER — LIDOCAINE HYDROCHLORIDE 10 MG/ML
0.5 INJECTION, SOLUTION EPIDURAL; INFILTRATION; INTRACAUDAL; PERINEURAL ONCE AS NEEDED
Status: DISCONTINUED | OUTPATIENT
Start: 2022-10-13 | End: 2022-10-13 | Stop reason: HOSPADM

## 2022-10-13 RX ORDER — ONDANSETRON 2 MG/ML
4 INJECTION INTRAMUSCULAR; INTRAVENOUS ONCE AS NEEDED
Status: DISCONTINUED | OUTPATIENT
Start: 2022-10-13 | End: 2022-10-13 | Stop reason: HOSPADM

## 2022-10-13 RX ORDER — SODIUM CHLORIDE 0.9 % (FLUSH) 0.9 %
3 SYRINGE (ML) INJECTION EVERY 12 HOURS SCHEDULED
Status: DISCONTINUED | OUTPATIENT
Start: 2022-10-13 | End: 2022-10-13 | Stop reason: HOSPADM

## 2022-10-13 RX ORDER — SODIUM CHLORIDE, SODIUM LACTATE, POTASSIUM CHLORIDE, CALCIUM CHLORIDE 600; 310; 30; 20 MG/100ML; MG/100ML; MG/100ML; MG/100ML
9 INJECTION, SOLUTION INTRAVENOUS CONTINUOUS
Status: DISCONTINUED | OUTPATIENT
Start: 2022-10-13 | End: 2022-10-13 | Stop reason: HOSPADM

## 2022-10-13 RX ORDER — MAGNESIUM HYDROXIDE 1200 MG/15ML
LIQUID ORAL AS NEEDED
Status: DISCONTINUED | OUTPATIENT
Start: 2022-10-13 | End: 2022-10-13 | Stop reason: HOSPADM

## 2022-10-13 RX ORDER — OXYMETAZOLINE HYDROCHLORIDE 0.05 G/100ML
SPRAY NASAL AS NEEDED
Status: DISCONTINUED | OUTPATIENT
Start: 2022-10-13 | End: 2022-10-13 | Stop reason: HOSPADM

## 2022-10-13 RX ORDER — LIDOCAINE HYDROCHLORIDE 20 MG/ML
INJECTION, SOLUTION INFILTRATION; PERINEURAL AS NEEDED
Status: DISCONTINUED | OUTPATIENT
Start: 2022-10-13 | End: 2022-10-13 | Stop reason: SURG

## 2022-10-13 RX ORDER — CEFAZOLIN SODIUM 2 G/100ML
2 INJECTION, SOLUTION INTRAVENOUS ONCE
Status: COMPLETED | OUTPATIENT
Start: 2022-10-13 | End: 2022-10-13

## 2022-10-13 RX ORDER — IPRATROPIUM BROMIDE AND ALBUTEROL SULFATE 2.5; .5 MG/3ML; MG/3ML
3 SOLUTION RESPIRATORY (INHALATION) ONCE AS NEEDED
Status: DISCONTINUED | OUTPATIENT
Start: 2022-10-13 | End: 2022-10-13 | Stop reason: HOSPADM

## 2022-10-13 RX ORDER — DIPHENHYDRAMINE HYDROCHLORIDE 50 MG/ML
12.5 INJECTION INTRAMUSCULAR; INTRAVENOUS
Status: DISCONTINUED | OUTPATIENT
Start: 2022-10-13 | End: 2022-10-13 | Stop reason: HOSPADM

## 2022-10-13 RX ORDER — OXYCODONE AND ACETAMINOPHEN 7.5; 325 MG/1; MG/1
1 TABLET ORAL EVERY 4 HOURS PRN
Status: DISCONTINUED | OUTPATIENT
Start: 2022-10-13 | End: 2022-10-13 | Stop reason: HOSPADM

## 2022-10-13 RX ADMIN — LIDOCAINE HYDROCHLORIDE 60 MG: 20 INJECTION, SOLUTION INFILTRATION; PERINEURAL at 08:50

## 2022-10-13 RX ADMIN — PROPOFOL 100 MCG/KG/MIN: 10 INJECTION, EMULSION INTRAVENOUS at 08:50

## 2022-10-13 RX ADMIN — SODIUM CHLORIDE, POTASSIUM CHLORIDE, SODIUM LACTATE AND CALCIUM CHLORIDE 9 ML/HR: 600; 310; 30; 20 INJECTION, SOLUTION INTRAVENOUS at 08:12

## 2022-10-13 RX ADMIN — FAMOTIDINE 20 MG: 10 INJECTION INTRAVENOUS at 08:12

## 2022-10-13 RX ADMIN — ONDANSETRON 4 MG: 2 INJECTION INTRAMUSCULAR; INTRAVENOUS at 09:47

## 2022-10-13 RX ADMIN — PROPOFOL 100 MG: 10 INJECTION, EMULSION INTRAVENOUS at 08:50

## 2022-10-13 RX ADMIN — SODIUM CHLORIDE, POTASSIUM CHLORIDE, SODIUM LACTATE AND CALCIUM CHLORIDE: 600; 310; 30; 20 INJECTION, SOLUTION INTRAVENOUS at 08:38

## 2022-10-13 RX ADMIN — CEFAZOLIN SODIUM 2 G: 2 INJECTION, SOLUTION INTRAVENOUS at 08:34

## 2022-10-13 NOTE — OP NOTE
OPERATIVE NOTE    Patient Identification:  Name: Soraya Coker  Age: 89 y.o.  Sex: female  :  10/27/1930  MRN: [id number]                                               Preoperative diagnosis: Right Dacryocystitis with nasolacrimal duct obstruction  Postoperative diagnosis: same  Procedure: Right Dacryocystorhinostomy with bradley tube  Surgeon: Dr. Ritchie Cardenas who was present and scrubbed throughout all critical portions of the operation  Assistants: MD Geovany Miguel MD  Anesthesia: General  EBL: less than 50 mL.  Specimens:  * No orders in the log *    Description of the procedure:     The patient was taken to the operating room and placed on the table in the supine position, where anesthesia was induced. 2% lidocaine with epinephrine and 0.5% marcaine in a 1:1 fashion was injected over the surgical site, and the patient was prepped and draped in the usual manner for orbitofacial surgery.     Corneal protectors were placed in both eyes. Afrin soaked cottonoids were placed in the ipsilateral nasal vault.    A 15 Bard-Kiel blade incision was made over the right anterior lacrimal crest. Sharp dissection was carried down to the lacrimal crest periosteum. The periosteum was elevated with a Tenzel periosteal elevator. A rhinostomy was created through the lacrimal sac fossa with a Tenzel periosteal elevator. The rhinostomy was enlarged to a dimension of 10 mm x 15 mm with Kerrison rongeurs. The nasal mucosa was opened vertically with electrocauterization. The lacrimal sac was opened vertically with sharp dissection. Purulent material was irrigated from the lacrimal sac. The posterior nasal mucosal flap was removed with sharp dissection. Bradley tubing was inserted through the upper and lower canaliculi and brought through the rhinostomy and into the nose, where it was tied in a 3 half knots. The anterior flaps were  sutured with 5-0 Vicryl suture. The medial canthal tendon was reanchored with 5-0  vicryl suture. The skin was closed with 5-0 fast absorbing suture.     The corneal protectors were removed and antibiotic ophthalmic ointment was placed over the surgical site.     The patient was then awakened and taken from the operating room in good condition, having tolerated the procedure well. There were no complications, and the estimated blood loss was less than 50 cc.

## 2022-10-13 NOTE — ANESTHESIA PREPROCEDURE EVALUATION
" Anesthesia Evaluation     Patient summary reviewed and Nursing notes reviewed   no history of anesthetic complications:  NPO Solid Status: > 8 hours  NPO Liquid Status: > 2 hours           Airway   Mallampati: II  Dental    (+) upper dentures and lower dentures    Pulmonary - negative pulmonary ROS   Cardiovascular   Exercise tolerance: poor (<4 METS)    PT is on anticoagulation therapy    (+) pacemaker, hypertension, CAD, CHF , hyperlipidemia,       Neuro/Psych- negative ROS  GI/Hepatic/Renal/Endo    (+)  GERD,  diabetes mellitus,     Musculoskeletal     Abdominal    Substance History - negative use     OB/GYN negative ob/gyn ROS         Other   arthritis,                      Anesthesia Plan    ASA 4     MAC     (/73 (BP Location: Right arm, Patient Position: Lying)   Pulse 91   Temp 36.6 °C (97.9 °F) (Oral)   Resp 18   Ht 157.5 cm (62\")   Wt 82.6 kg (182 lb)   SpO2 93%   BMI 33.29 kg/m²     I have reviewed the patient's history with the patient and the chart, including all pertinent laboratory results and imaging. I have explained the risks of anesthesia including but not limited to dental damage, corneal abrasion, nerve injury, MI, stroke, and death.    )    Anesthetic plan, risks, benefits, and alternatives have been provided, discussed and informed consent has been obtained with: patient.        CODE STATUS:       "

## 2022-10-13 NOTE — H&P
History & Physical       Patient: Soraya Coker    Date of Admission: 10/13/2022  6:21 AM    YOB: 1930    Medical Record Number: [id number]      Chief Complaints: tearing right side      History of Present Illness: 89 y.o. female presents with right dacryocystitis and nldo. No new meds/health problems since office visit      Allergies:   Allergies   Allergen Reactions   • Adhesive Tape Rash       Review of systems negative, except pertaining to the HPI    Medications:   Home Medications:  No current facility-administered medications on file prior to encounter.     Current Outpatient Medications on File Prior to Encounter   Medication Sig   • acetaminophen (TYLENOL) 650 MG 8 hr tablet Take 650 mg by mouth Every 8 (Eight) Hours As Needed for Mild Pain.   • apixaban (ELIQUIS) 2.5 MG tablet tablet Take 2.5 mg by mouth 2 (Two) Times a Day.   • aspirin 81 MG chewable tablet Chew 81 mg Daily.   • atorvastatin (LIPITOR) 40 MG tablet Take 40 mg by mouth Daily.   • bisacodyl (DULCOLAX) 5 MG EC tablet Take 5 mg by mouth Daily As Needed for Constipation.   • brompheniramine-pseudoephedrine-DM (Bromfed DM) 30-2-10 MG/5ML syrup Take  by mouth 4 (Four) Times a Day As Needed for Allergies.   • clotrimazole (LOTRIMIN) 1 % cream Apply 1 application topically to the appropriate area as directed 2 (Two) Times a Day.   • diphenhydrAMINE (BENADRYL) 25 mg capsule Take 25 mg by mouth Every 6 (Six) Hours As Needed for Itching.   • docusate sodium (COLACE) 100 MG capsule Take 100 mg by mouth 2 (Two) Times a Day.   • fluconazole (DIFLUCAN) 200 MG tablet Take 200 mg by mouth Daily.   • furosemide (LASIX) 40 MG tablet Take 40 mg by mouth 2 (Two) Times a Day.   • ipratropium-albuterol (DUO-NEB) 0.5-2.5 mg/3 ml nebulizer Take 3 mL by nebulization Every 4 (Four) Hours As Needed for Wheezing.   • lactobacillus (BACID) tablet caplet Take  by mouth 2 (Two) Times a Day.   • Loratadine (Claritin) 10 MG capsule Take  by mouth.   •  metoprolol succinate XL (TOPROL-XL) 100 MG 24 hr tablet Take 100 mg by mouth Daily.   • neomycin-bacitracin-polymyxin (NEOSPORIN) 5-400-5000 ointment Apply 1 application topically to the appropriate area as directed 4 (Four) Times a Day.   • nystatin (MYCOSTATIN) 096639 UNIT/GM powder Apply  topically to the appropriate area as directed 4 (Four) Times a Day.   • polyethylene glycol (MiraLax) 17 g packet Take 17 g by mouth Daily.   • simethicone (MYLICON) 125 MG chewable tablet Chew 125 mg Every 6 (Six) Hours As Needed for Flatulence.   • tobramycin (TOBREX) 0.3 % ointment ophthalmic ointment Every 6 (Six) Hours.   • vitamin B-12 (CYANOCOBALAMIN) 1000 MCG tablet Take 2,000 mcg by mouth Daily.   • vitamin C (ASCORBIC ACID) 250 MG tablet Take 500 mg by mouth Daily.     Current Medications:  Scheduled Meds:  Continuous Infusions:No current facility-administered medications for this encounter.    PRN Meds:.    Past Medical History:   Diagnosis Date   • Arthritis    • CAD (coronary artery disease)    • CHF (congestive heart failure) (HCC)    • Diabetes mellitus (HCC)    • GERD (gastroesophageal reflux disease)    • Hyperlipidemia    • Hypertension    • Pneumonia    • Sick sinus syndrome (HCC)         Past Surgical History:   Procedure Laterality Date   • CARDIAC DEFIBRILLATOR PLACEMENT     • MASTECTOMY Left    • REPLACEMENT TOTAL KNEE Bilateral         Social History     Occupational History   • Not on file   Tobacco Use   • Smoking status: Never   • Smokeless tobacco: Never   Vaping Use   • Vaping Use: Not on file   Substance and Sexual Activity   • Alcohol use: Not Currently   • Drug use: Not Currently   • Sexual activity: Defer      Social History     Social History Narrative   • Not on file      No family history on file.        Physical Exam   There were no vitals taken for this visit.  Constitutional: Alert, cooperative, in no acute distress    Head: Normocephalic.   Eyes:   Enlarged right NLD  Neck: Normal range of  motion.   Cardiovascular: Normal rate.    Pulmonary/Chest: Effort normal.   Neurological: Alert.   Skin: Skin is warm.   Psychiatric: Normal mood and affect.       Assessment/Plan:  The patient voiced understanding of the risks, benefits, and alternative forms of treatment that were discussed and the patient consents to proceed with RIGHT DACRYOCYSTORHINOSTOMY WITH CURTIS TUBE.       Ritchie Cardenas MD

## 2022-10-13 NOTE — ANESTHESIA POSTPROCEDURE EVALUATION
Patient: Soraya Coker    Procedure Summary     Date: 10/13/22 Room / Location: [facility] OR [address] [place] [address] MAIN OR    Anesthesia Start: 0838 Anesthesia Stop: 1002    Procedure: RIGHT DACRYOCYSTORHINOSTOMY WITH CURTIS TUBE (Right: Face) Diagnosis:     Surgeons: Ritchie Cardenas MD Provider: Hernandez Cardona MD    Anesthesia Type: MAC ASA Status: 4          Anesthesia Type: MAC    Vitals  Vitals Value Taken Time   /87 10/13/22 1105   Temp 36.5 °C (97.7 °F) 10/13/22 1055   Pulse 88 10/13/22 1105   Resp 18 10/13/22 1105   SpO2 93 % 10/13/22 1105           Post Anesthesia Care and Evaluation    Patient location during evaluation: PHASE II  Patient participation: complete - patient participated  Level of consciousness: awake  Pain score: 1  Pain management: adequate    Airway patency: patent  Anesthetic complications: No anesthetic complications  PONV Status: none  Cardiovascular status: acceptable  Respiratory status: acceptable  Hydration status: acceptable

## 2022-10-13 NOTE — ANESTHESIA PROCEDURE NOTES
Airway  Urgency: elective    Airway not difficult    General Information and Staff    Patient location during procedure: OR    Indications and Patient Condition  Indications for airway management: airway protection    Preoxygenated: yes  Mask difficulty assessment: 0 - not attempted    Final Airway Details  Final airway type: supraglottic airway      Successful airway: classic  Size 4     Number of attempts at approach: 1  Assessment: lips, teeth, and gum same as pre-op and atraumatic intubation

## 2023-02-22 ENCOUNTER — LAB REQUISITION (OUTPATIENT)
Dept: LAB | Facility: HOSPITAL | Age: 88
End: 2023-02-22
Payer: MEDICARE

## 2023-02-22 DIAGNOSIS — L08.9 LOCAL INFECTION OF THE SKIN AND SUBCUTANEOUS TISSUE, UNSPECIFIED: ICD-10-CM

## 2023-02-22 PROCEDURE — 87205 SMEAR GRAM STAIN: CPT | Performed by: INTERNAL MEDICINE

## 2023-02-22 PROCEDURE — 87070 CULTURE OTHR SPECIMN AEROBIC: CPT | Performed by: INTERNAL MEDICINE

## 2023-02-23 LAB
BACTERIA SPEC AEROBE CULT: ABNORMAL
GRAM STN SPEC: ABNORMAL
GRAM STN SPEC: ABNORMAL

## 2023-03-05 ENCOUNTER — HOSPITAL ENCOUNTER (INPATIENT)
Facility: HOSPITAL | Age: 88
LOS: 3 days | Discharge: NURSING, REHAB OR LONG-TERM CARE | DRG: 689 | End: 2023-03-10
Attending: EMERGENCY MEDICINE | Admitting: FAMILY MEDICINE
Payer: MEDICARE

## 2023-03-05 ENCOUNTER — APPOINTMENT (OUTPATIENT)
Dept: GENERAL RADIOLOGY | Facility: HOSPITAL | Age: 88
DRG: 689 | End: 2023-03-05
Payer: MEDICARE

## 2023-03-05 DIAGNOSIS — R26.2 DIFFICULTY IN WALKING: ICD-10-CM

## 2023-03-05 DIAGNOSIS — R26.2 DIFFICULTY WALKING: ICD-10-CM

## 2023-03-05 DIAGNOSIS — R53.1 WEAKNESS: ICD-10-CM

## 2023-03-05 DIAGNOSIS — N39.0 ACUTE URINARY TRACT INFECTION: Primary | ICD-10-CM

## 2023-03-05 LAB
ALBUMIN SERPL-MCNC: 3.5 G/DL (ref 3.5–5.2)
ALBUMIN/GLOB SERPL: 1 G/DL
ALP SERPL-CCNC: 125 U/L (ref 39–117)
ALT SERPL W P-5'-P-CCNC: 11 U/L (ref 1–33)
ANION GAP SERPL CALCULATED.3IONS-SCNC: 9.9 MMOL/L (ref 5–15)
AST SERPL-CCNC: 17 U/L (ref 1–32)
BACTERIA UR QL AUTO: ABNORMAL /HPF
BASOPHILS # BLD AUTO: 0.07 10*3/MM3 (ref 0–0.2)
BASOPHILS NFR BLD AUTO: 0.6 % (ref 0–1.5)
BILIRUB SERPL-MCNC: 0.6 MG/DL (ref 0–1.2)
BILIRUB UR QL STRIP: NEGATIVE
BUN SERPL-MCNC: 13 MG/DL (ref 8–23)
BUN/CREAT SERPL: 24.1 (ref 7–25)
CALCIUM SPEC-SCNC: 8.9 MG/DL (ref 8.2–9.6)
CHLORIDE SERPL-SCNC: 92 MMOL/L (ref 98–107)
CLARITY UR: ABNORMAL
CO2 SERPL-SCNC: 29.1 MMOL/L (ref 22–29)
COLOR UR: YELLOW
CREAT SERPL-MCNC: 0.54 MG/DL (ref 0.57–1)
D-LACTATE SERPL-SCNC: 1.2 MMOL/L (ref 0.5–2)
DEPRECATED RDW RBC AUTO: 42 FL (ref 37–54)
EGFRCR SERPLBLD CKD-EPI 2021: 86.5 ML/MIN/1.73
EOSINOPHIL # BLD AUTO: 0.86 10*3/MM3 (ref 0–0.4)
EOSINOPHIL NFR BLD AUTO: 8 % (ref 0.3–6.2)
ERYTHROCYTE [DISTWIDTH] IN BLOOD BY AUTOMATED COUNT: 14 % (ref 12.3–15.4)
GLOBULIN UR ELPH-MCNC: 3.5 GM/DL
GLUCOSE SERPL-MCNC: 133 MG/DL (ref 65–99)
GLUCOSE UR STRIP-MCNC: NEGATIVE MG/DL
HCT VFR BLD AUTO: 40.7 % (ref 34–46.6)
HGB BLD-MCNC: 13.2 G/DL (ref 12–15.9)
HGB UR QL STRIP.AUTO: ABNORMAL
HOLD SPECIMEN: NORMAL
HYALINE CASTS UR QL AUTO: ABNORMAL /LPF
IMM GRANULOCYTES # BLD AUTO: 0.03 10*3/MM3 (ref 0–0.05)
IMM GRANULOCYTES NFR BLD AUTO: 0.3 % (ref 0–0.5)
KETONES UR QL STRIP: NEGATIVE
LEUKOCYTE ESTERASE UR QL STRIP.AUTO: ABNORMAL
LYMPHOCYTES # BLD AUTO: 2.43 10*3/MM3 (ref 0.7–3.1)
LYMPHOCYTES NFR BLD AUTO: 22.5 % (ref 19.6–45.3)
MAGNESIUM SERPL-MCNC: 1.7 MG/DL (ref 1.7–2.3)
MCH RBC QN AUTO: 26.8 PG (ref 26.6–33)
MCHC RBC AUTO-ENTMCNC: 32.4 G/DL (ref 31.5–35.7)
MCV RBC AUTO: 82.7 FL (ref 79–97)
MONOCYTES # BLD AUTO: 1.16 10*3/MM3 (ref 0.1–0.9)
MONOCYTES NFR BLD AUTO: 10.7 % (ref 5–12)
NEUTROPHILS NFR BLD AUTO: 57.9 % (ref 42.7–76)
NEUTROPHILS NFR BLD AUTO: 6.26 10*3/MM3 (ref 1.7–7)
NITRITE UR QL STRIP: POSITIVE
NRBC BLD AUTO-RTO: 0 /100 WBC (ref 0–0.2)
NT-PROBNP SERPL-MCNC: 384.9 PG/ML (ref 0–1800)
PH UR STRIP.AUTO: 7 [PH] (ref 5–8)
PLATELET # BLD AUTO: 389 10*3/MM3 (ref 140–450)
PMV BLD AUTO: 8.9 FL (ref 6–12)
POTASSIUM SERPL-SCNC: 4 MMOL/L (ref 3.5–5.2)
PROT SERPL-MCNC: 7 G/DL (ref 6–8.5)
PROT UR QL STRIP: NEGATIVE
RBC # BLD AUTO: 4.92 10*6/MM3 (ref 3.77–5.28)
RBC # UR STRIP: ABNORMAL /HPF
REF LAB TEST METHOD: ABNORMAL
SODIUM SERPL-SCNC: 131 MMOL/L (ref 136–145)
SP GR UR STRIP: 1.01 (ref 1–1.03)
SQUAMOUS #/AREA URNS HPF: ABNORMAL /HPF
TROPONIN T SERPL HS-MCNC: 20 NG/L
UROBILINOGEN UR QL STRIP: ABNORMAL
WBC # UR STRIP: ABNORMAL /HPF
WBC NRBC COR # BLD: 10.81 10*3/MM3 (ref 3.4–10.8)
WHOLE BLOOD HOLD COAG: NORMAL
WHOLE BLOOD HOLD SPECIMEN: NORMAL

## 2023-03-05 PROCEDURE — 71045 X-RAY EXAM CHEST 1 VIEW: CPT

## 2023-03-05 PROCEDURE — 84484 ASSAY OF TROPONIN QUANT: CPT | Performed by: EMERGENCY MEDICINE

## 2023-03-05 PROCEDURE — 93010 ELECTROCARDIOGRAM REPORT: CPT | Performed by: INTERNAL MEDICINE

## 2023-03-05 PROCEDURE — 87077 CULTURE AEROBIC IDENTIFY: CPT | Performed by: EMERGENCY MEDICINE

## 2023-03-05 PROCEDURE — G0378 HOSPITAL OBSERVATION PER HR: HCPCS

## 2023-03-05 PROCEDURE — 99222 1ST HOSP IP/OBS MODERATE 55: CPT | Performed by: FAMILY MEDICINE

## 2023-03-05 PROCEDURE — 80053 COMPREHEN METABOLIC PANEL: CPT | Performed by: EMERGENCY MEDICINE

## 2023-03-05 PROCEDURE — 83605 ASSAY OF LACTIC ACID: CPT | Performed by: EMERGENCY MEDICINE

## 2023-03-05 PROCEDURE — 87086 URINE CULTURE/COLONY COUNT: CPT | Performed by: EMERGENCY MEDICINE

## 2023-03-05 PROCEDURE — 25010000002 CEFTRIAXONE PER 250 MG: Performed by: EMERGENCY MEDICINE

## 2023-03-05 PROCEDURE — 83735 ASSAY OF MAGNESIUM: CPT | Performed by: EMERGENCY MEDICINE

## 2023-03-05 PROCEDURE — 99285 EMERGENCY DEPT VISIT HI MDM: CPT

## 2023-03-05 PROCEDURE — 93005 ELECTROCARDIOGRAM TRACING: CPT | Performed by: EMERGENCY MEDICINE

## 2023-03-05 PROCEDURE — 85025 COMPLETE CBC W/AUTO DIFF WBC: CPT | Performed by: EMERGENCY MEDICINE

## 2023-03-05 PROCEDURE — 83880 ASSAY OF NATRIURETIC PEPTIDE: CPT | Performed by: EMERGENCY MEDICINE

## 2023-03-05 PROCEDURE — 87186 SC STD MICRODIL/AGAR DIL: CPT | Performed by: EMERGENCY MEDICINE

## 2023-03-05 PROCEDURE — 25010000002 ONDANSETRON PER 1 MG: Performed by: EMERGENCY MEDICINE

## 2023-03-05 PROCEDURE — 81001 URINALYSIS AUTO W/SCOPE: CPT | Performed by: EMERGENCY MEDICINE

## 2023-03-05 RX ORDER — POTASSIUM CHLORIDE 20 MEQ/1
40 TABLET, EXTENDED RELEASE ORAL 2 TIMES DAILY
COMMUNITY

## 2023-03-05 RX ORDER — SODIUM CHLORIDE 0.9 % (FLUSH) 0.9 %
10 SYRINGE (ML) INJECTION EVERY 12 HOURS SCHEDULED
Status: DISCONTINUED | OUTPATIENT
Start: 2023-03-05 | End: 2023-03-10 | Stop reason: HOSPADM

## 2023-03-05 RX ORDER — SODIUM CHLORIDE 0.9 % (FLUSH) 0.9 %
10 SYRINGE (ML) INJECTION AS NEEDED
Status: DISCONTINUED | OUTPATIENT
Start: 2023-03-05 | End: 2023-03-10 | Stop reason: HOSPADM

## 2023-03-05 RX ORDER — NITROGLYCERIN 0.4 MG/1
0.4 TABLET SUBLINGUAL
Status: DISCONTINUED | OUTPATIENT
Start: 2023-03-05 | End: 2023-03-10 | Stop reason: HOSPADM

## 2023-03-05 RX ORDER — ONDANSETRON 4 MG/1
4 TABLET, FILM COATED ORAL EVERY 8 HOURS PRN
COMMUNITY

## 2023-03-05 RX ORDER — CEFTRIAXONE SODIUM 1 G/50ML
1 INJECTION, SOLUTION INTRAVENOUS ONCE
Status: COMPLETED | OUTPATIENT
Start: 2023-03-05 | End: 2023-03-05

## 2023-03-05 RX ORDER — ONDANSETRON 2 MG/ML
4 INJECTION INTRAMUSCULAR; INTRAVENOUS ONCE
Status: COMPLETED | OUTPATIENT
Start: 2023-03-05 | End: 2023-03-05

## 2023-03-05 RX ORDER — ACETAMINOPHEN 325 MG/1
650 TABLET ORAL EVERY 4 HOURS PRN
Status: DISCONTINUED | OUTPATIENT
Start: 2023-03-05 | End: 2023-03-06 | Stop reason: SDUPTHER

## 2023-03-05 RX ORDER — SODIUM CHLORIDE 9 MG/ML
40 INJECTION, SOLUTION INTRAVENOUS AS NEEDED
Status: DISCONTINUED | OUTPATIENT
Start: 2023-03-05 | End: 2023-03-10 | Stop reason: HOSPADM

## 2023-03-05 RX ORDER — CEFTRIAXONE SODIUM 1 G/50ML
1 INJECTION, SOLUTION INTRAVENOUS EVERY 24 HOURS
Status: DISCONTINUED | OUTPATIENT
Start: 2023-03-06 | End: 2023-03-08

## 2023-03-05 RX ORDER — ACETAMINOPHEN 650 MG/1
650 SUPPOSITORY RECTAL EVERY 4 HOURS PRN
Status: DISCONTINUED | OUTPATIENT
Start: 2023-03-05 | End: 2023-03-10 | Stop reason: HOSPADM

## 2023-03-05 RX ORDER — ACETAMINOPHEN 160 MG/5ML
650 SOLUTION ORAL EVERY 4 HOURS PRN
Status: DISCONTINUED | OUTPATIENT
Start: 2023-03-05 | End: 2023-03-10 | Stop reason: HOSPADM

## 2023-03-05 RX ADMIN — CEFTRIAXONE SODIUM 1 G: 1 INJECTION, SOLUTION INTRAVENOUS at 17:12

## 2023-03-05 RX ADMIN — SODIUM CHLORIDE 1000 ML: 9 INJECTION, SOLUTION INTRAVENOUS at 14:55

## 2023-03-05 RX ADMIN — ONDANSETRON 4 MG: 2 INJECTION INTRAMUSCULAR; INTRAVENOUS at 14:55

## 2023-03-05 RX ADMIN — Medication 10 ML: at 20:00

## 2023-03-05 NOTE — ED PROVIDER NOTES
Time: 1:41 PM EST  Date of encounter:  3/5/2023  Independent Historian/Clinical History and Information was obtained by:   Patient and Nursing Staff  Chief Complaint: weakness    History is limited by: N/A    History of Present Illness:  Patient is a 89 y.o. year old female who presents to the emergency department for evaluation of generalized weakness for the past few days. Pt denies any fever or chest pain. Pt is from [facility]. She has had nausea, decreased appetite and fatigue for the past few days. Pt also has mild SOB as well. Nursing home sent Pt to ED due to increased weakness and fatigue.     HPI    Patient Care Team  Primary Care Provider: Thomas Augustin MD    Past Medical History:     Allergies   Allergen Reactions   • Latex Other (See Comments)     Blisters  Blisters  Other reaction(s): Other (See Comments)  Blisters  Blisters  Blisters     • Adhesive Tape Rash     Past Medical History:   Diagnosis Date   • Arthritis    • CAD (coronary artery disease)    • CHF (congestive heart failure) ([facility])    • Diabetes mellitus (HCC)    • GERD (gastroesophageal reflux disease)    • Hyperlipidemia    • Hypertension    • Pneumonia    • Sick sinus syndrome (HCC)      Past Surgical History:   Procedure Laterality Date   • CARDIAC DEFIBRILLATOR PLACEMENT     • DACRYOCYSTORHINOSTOMY Right 10/13/2022    Procedure: RIGHT DACRYOCYSTORHINOSTOMY WITH CURITS TUBE;  Surgeon: Ritchie Cardenas MD;  Location: [facility];  Service: Ophthalmology;  Laterality: Right;   • MASTECTOMY Left    • REPLACEMENT TOTAL KNEE Bilateral      Family History   Problem Relation Age of Onset   • Malig Hyperthermia Neg Hx        Home Medications:  Prior to Admission medications    Medication Sig Start Date End Date Taking? Authorizing Provider   acetaminophen (TYLENOL) 650 MG 8 hr tablet Take 650 mg by mouth Every 8 (Eight) Hours As Needed for Mild Pain.    Provider, MD Carley   apixaban (ELIQUIS) 2.5 MG  tablet tablet Take 2.5 mg by mouth 2 (Two) Times a Day.    Carley Pickett MD   atorvastatin (LIPITOR) 40 MG tablet Take 40 mg by mouth Daily.    Carley Pickett MD   bisacodyl (DULCOLAX) 5 MG EC tablet Take 5 mg by mouth Daily As Needed for Constipation.    Carley Pickett MD   brompheniramine-pseudoephedrine-DM 30-2-10 MG/5ML syrup Take  by mouth 4 (Four) Times a Day As Needed for Allergies.    Carley Pickett MD   clotrimazole (LOTRIMIN) 1 % cream Apply 1 application topically to the appropriate area as directed 2 (Two) Times a Day.    Carley Pickett MD   diphenhydrAMINE (BENADRYL) 25 mg capsule Take 25 mg by mouth Every 6 (Six) Hours As Needed for Itching.    Carley Pickett MD   docusate sodium (COLACE) 100 MG capsule Take 100 mg by mouth 2 (Two) Times a Day.    Carley Pickett MD   erythromycin (ROMYCIN) 5 MG/GM ophthalmic ointment Administer to incision 2 (Two) Times a Day for 1 week 10/13/22   Ritchie Cardenas MD   furosemide (LASIX) 40 MG tablet Take 40 mg by mouth 2 (Two) Times a Day.    Carley Pickett MD   ipratropium-albuterol (DUO-NEB) 0.5-2.5 mg/3 ml nebulizer Take 3 mL by nebulization Every 4 (Four) Hours As Needed for Wheezing.    Carley Pickett MD   lactobacillus (BACID) tablet caplet Take  by mouth 2 (Two) Times a Day.    Carley Pickett MD   levothyroxine (SYNTHROID, LEVOTHROID) 150 MCG tablet Take 1 tablet by mouth Daily.    Carley Pickett MD   Loratadine 10 MG capsule Take  by mouth.    Carley Pickett MD   neomycin-bacitracin-polymyxin (NEOSPORIN) 5-400-5000 ointment Apply 1 application topically to the appropriate area as directed 4 (Four) Times a Day.    Carley Pickett MD   nystatin (MYCOSTATIN) 974444 UNIT/GM powder Apply  topically to the appropriate area as directed 4 (Four) Times a Day.    Carley Pickett MD   polyethylene glycol (MIRALAX) 17 g packet Take 17 g by mouth Daily.    Piyush  "MD Carley   simethicone (MYLICON) 125 MG chewable tablet Chew 125 mg Every 6 (Six) Hours As Needed for Flatulence.    ProviderCarley MD   tobramycin (TOBREX) 0.3 % ointment ophthalmic ointment Every 6 (Six) Hours.    Provider, MD Carley        Social History:   Social History     Tobacco Use   • Smoking status: Never   • Smokeless tobacco: Never   Vaping Use   • Vaping Use: Never used   Substance Use Topics   • Alcohol use: Not Currently   • Drug use: Not Currently         Review of Systems:  Review of Systems   Constitutional: Positive for appetite change and fatigue. Negative for chills and fever.   HENT: Negative for sore throat.    Eyes: Negative for photophobia.   Respiratory: Positive for shortness of breath.    Cardiovascular: Negative for chest pain.   Gastrointestinal: Positive for nausea. Negative for abdominal pain, diarrhea and vomiting.   Genitourinary: Negative for dysuria.   Musculoskeletal: Negative for neck pain.   Skin: Negative for wound.   Neurological: Positive for weakness. Negative for headaches.   All other systems reviewed and are negative.       Physical Exam:  /62 (BP Location: Right arm, Patient Position: Lying)   Pulse 56   Temp 98.5 °F (36.9 °C) (Oral)   Resp 16   Ht 157.5 cm (62.01\")   Wt 78.1 kg (172 lb 2.9 oz)   SpO2 92%   BMI 31.48 kg/m²     Physical Exam  Vitals and nursing note reviewed.   Constitutional:       General: She is not in acute distress.  HENT:      Head: Normocephalic and atraumatic.   Eyes:      Extraocular Movements: Extraocular movements intact.   Cardiovascular:      Rate and Rhythm: Normal rate and regular rhythm.   Pulmonary:      Effort: Pulmonary effort is normal. No respiratory distress.      Breath sounds: Normal breath sounds.   Abdominal:      General: Abdomen is flat.      Palpations: Abdomen is soft.      Tenderness: There is no abdominal tenderness.   Musculoskeletal:         General: Normal range of motion.      Cervical " back: Normal range of motion and neck supple.   Skin:     General: Skin is warm and dry.      Capillary Refill: Capillary refill takes less than 2 seconds.   Neurological:      Mental Status: She is alert and oriented to person, place, and time. Mental status is at baseline.      Cranial Nerves: Cranial nerves 2-12 are intact.      Sensory: Sensation is intact.      Motor: Weakness present.      Coordination: Coordination is intact.                  Procedures:  Procedures      Medical Decision Making:      Comorbidities that affect care:    Congestive Heart Failure, Coronary Artery Disease, Diabetes    External Notes reviewed:    None      The following orders were placed and all results were independently analyzed by me:  Orders Placed This Encounter   Procedures   • Urine Culture - Urine, Urine, Clean Catch   • XR Chest 1 View   • Edinburgh Draw   • Comprehensive Metabolic Panel   • Single High Sensitivity Troponin T   • Magnesium   • Urinalysis With Microscopic If Indicated (No Culture) - Urine, Clean Catch   • CBC Auto Differential   • BNP   • Urinalysis, Microscopic Only - Urine, Clean Catch   • Lactic Acid, Plasma   • Potassium   • Magnesium   • High Sensitivity Troponin T   • Blood Gas, Arterial -With Co-Ox Panel: Yes   • CBC Auto Differential   • Basic Metabolic Panel   • Magnesium   • Edinburgh Draw   • Basic Metabolic Panel   • CBC Auto Differential   • CBC Auto Differential   • CBC Auto Differential   • CBC Auto Differential   • Diet: Diabetic Diets; Consistent Carbohydrate; Texture: Regular Texture (IDDSI 7); Fluid Consistency: Thin (IDDSI 0)   • Undress & Gown   • Continuous Pulse Oximetry   • Vital Signs   • Vital Signs   • Intake & Output   • Weigh Patient   • Oral Care   • Place Sequential Compression Device   • Maintain Sequential Compression Device   • Telemetry - Maintain IV Access   • Telemetry - Pulse Oximetry   • Activity - Bed Rest With Exceptions (Specify)   • Notify Provider (With Default  Parameters)   • Elevate Heels Off of Bed   • Turn Patient   • Use Seat Cushion When Up In Chair   • Head of Bed 30 Degrees or Less   • Apply Moisture Barrier After Any Incontinence   • Apply Moisture Barrier to All Bony Prominences   • Wound Care   • Skin Care   • Discontinue Cardiac Monitoring   • Code Status and Medical Interventions:   • Inpatient Hospitalist Consult   • Inpatient Case Management  Consult   • Inpatient Nutrition Consult   • Dietary Nutrition Supplements Boost Glucose Control (Glucerna Shake); vanilla   • Dietary Nutrition Supplements Ke; orange   • DIET MESSAGE No muffins, only toast with SF jelly for breakfast along with other items ordered   • PT Consult: Eval & Treat Functional Mobility Below Baseline   • PT Plan of Care Cert / Re-Cert   • Oxygen Therapy- Nasal Cannula; Titrate for SPO2: 90% - 95%   • Oxygen Therapy- Nasal Cannula; Titrate for SPO2: 90% - 95%   • POC Glucose Once   • ECG 12 Lead ED Triage Standing Order; Weak / Dizzy / AMS   • ECG 12 Lead Chest Pain   • SCANNED - TELEMETRY     • SCANNED - TELEMETRY     • SCANNED - TELEMETRY     • SCANNED - TELEMETRY     • Wound Ostomy Eval & Treat   • Insert Peripheral IV   • Insert Peripheral IV   • Initiate Observation Status   • Inpatient Admission   • Fall Precautions   • Fall Precautions   • CBC & Differential   • Green Top (Gel)   • Lavender Top   • Gold Top - SST   • Light Blue Top   • [place] Blood Culture Bottle Set   • CBC & Differential       Medications Given in the Emergency Department:  Medications   sodium chloride 0.9 % flush 10 mL (has no administration in time range)   sodium chloride 0.9 % flush 10 mL (has no administration in time range)   sodium chloride 0.9 % flush 10 mL (10 mL Intravenous Given 3/9/23 1017)   sodium chloride 0.9 % infusion 40 mL (40 mL Intravenous Given 3/8/23 0639)   nitroglycerin (NITROSTAT) SL tablet 0.4 mg (has no administration in time range)   acetaminophen (TYLENOL) 160 MG/5ML  solution 650 mg (has no administration in time range)     Or   acetaminophen (TYLENOL) suppository 650 mg (has no administration in time range)   acetaminophen (TYLENOL) 8 hr tablet 650 mg (has no administration in time range)   apixaban (ELIQUIS) tablet 2.5 mg (2.5 mg Oral Given 3/9/23 1014)   atorvastatin (LIPITOR) tablet 40 mg (40 mg Oral Given 3/8/23 2113)   bisacodyl (DULCOLAX) EC tablet 5 mg (5 mg Oral Given 3/8/23 0827)   miconazole (MICOTIN) 2 % cream 1 application (1 application Topical Given 3/9/23 1018)   docusate sodium (COLACE) capsule 100 mg (100 mg Oral Given 3/9/23 1014)   furosemide (LASIX) tablet 40 mg (40 mg Oral Given 3/9/23 1014)   ipratropium-albuterol (DUO-NEB) nebulizer solution 3 mL (has no administration in time range)   levothyroxine (SYNTHROID, LEVOTHROID) tablet 150 mcg (150 mcg Oral Given 3/9/23 0717)   cetirizine (zyrTEC) tablet 10 mg (10 mg Oral Given 3/9/23 1014)   neomycin-bacitracin-polymyxin b (NEOSPORIN) ointment 1 application (has no administration in time range)   ondansetron (ZOFRAN) tablet 4 mg (has no administration in time range)   polyethylene glycol (MIRALAX) packet 17 g (17 g Oral Given 3/9/23 1014)   potassium chloride (KLOR-CON) packet 20 mEq (20 mEq Oral Given 3/9/23 1014)   simethicone (MYLICON) chewable tablet 120 mg (has no administration in time range)   Tiny-Bid Probiotic tablet 1 tablet (1 tablet Oral Given 3/9/23 1014)   hydrocortisone-bacitracin-zinc oxide-nystatin (MAGIC BARRIER) ointment 1 application (1 application Topical Given 3/9/23 0830)   aluminum sulfate-calcium acetate (DOMEBORO) topical packet 1 each (1 each Topical Given 3/9/23 1015)   Pharmacy to Dose Zosyn (has no administration in time range)   piperacillin-tazobactam (ZOSYN) 3.375 g in iso-osmotic dextrose 50 ml (premix) (3.375 g Intravenous New Bag 3/9/23 3596)   sodium chloride 0.9 % bolus 1,000 mL (0 mL Intravenous Stopped 3/5/23 9604)   ondansetron (ZOFRAN) injection 4 mg (4 mg Intravenous  Given 3/5/23 6045)   cefTRIAXone (ROCEPHIN) IVPB 1 g (1 g Intravenous New Bag 3/5/23 1712)   piperacillin-tazobactam (ZOSYN) 3.375 g in iso-osmotic dextrose 50 ml (premix) (3.375 g Intravenous New Bag 3/8/23 0639)        ED Course:         Labs:    Lab Results (last 24 hours)     Procedure Component Value Units Date/Time    CBC & Differential [[id number]]  (Abnormal) Collected: 03/09/23 0608    Specimen: Blood Updated: 03/09/23 0623    Narrative:      The following orders were created for panel order CBC & Differential.  Procedure                               Abnormality         Status                     ---------                               -----------         ------                     CBC Auto Differential[[id number]]        Abnormal            Final result                 Please view results for these tests on the individual orders.    Basic Metabolic Panel [[id number]]  (Abnormal) Collected: 03/09/23 0608    Specimen: Blood Updated: 03/09/23 0646     Glucose 118 mg/dL      BUN 15 mg/dL      Creatinine 0.50 mg/dL      Sodium 133 mmol/L      Potassium 4.0 mmol/L      Comment: Slight hemolysis detected by analyzer. Results may be affected.        Chloride 94 mmol/L      CO2 29.3 mmol/L      Calcium 8.7 mg/dL      BUN/Creatinine Ratio 30.0     Anion Gap 9.7 mmol/L      eGFR 88.1 mL/min/1.73     Narrative:      GFR Normal >60  Chronic Kidney Disease <60  Kidney Failure <15    The GFR formula is only valid for adults with stable renal function between ages 18 and 70.    CBC Auto Differential [[id number]]  (Abnormal) Collected: 03/09/23 0608    Specimen: Blood Updated: 03/09/23 0623     WBC 8.34 10*3/mm3      RBC 4.48 10*6/mm3      Hemoglobin 12.2 g/dL      Hematocrit 36.5 %      MCV 81.5 fL      MCH 27.2 pg      MCHC 33.4 g/dL      RDW 13.9 %      RDW-SD 41.0 fl      MPV 8.8 fL      Platelets 334 10*3/mm3      Neutrophil % 49.4 %      Lymphocyte % 21.3 %      Monocyte % 14.5 %      Eosinophil % 13.2 %      Basophil %  1.0 %      Immature Grans % 0.6 %      Neutrophils, Absolute 4.12 10*3/mm3      Lymphocytes, Absolute 1.78 10*3/mm3      Monocytes, Absolute 1.21 10*3/mm3      Eosinophils, Absolute 1.10 10*3/mm3      Basophils, Absolute 0.08 10*3/mm3      Immature Grans, Absolute 0.05 10*3/mm3      nRBC 0.0 /100 WBC            Imaging:    No Radiology Exams Resulted Within Past 24 Hours      Differential Diagnosis and Discussion:    Weakness: Based on the patient's history, signs, and symptoms, the diffential diagnosis includes but is not limited to meningitis, stroke, sepsis, subarachnoid hemorrhage, intracranial bleeding, encephalitis, acute uti, dehydration, MS, myasthenia gravis, Guillan [place], migraine variant, neuromuscular disorders vertigo, electrolyte imbalance, and metabolic disorders.    All labs were reviewed and interpreted by me.    WANDY         Patient Care Considerations:          Consultants/Shared Management Plan:    None  Hospitalist: I have discussed the case with Dr. Coppola who agrees to accept the patient for admission.    Social Determinants of Health:    Patient is independent, reliable, and has access to care.       Disposition and Care Coordination:    Admit:   Through independent evaluation of the patient's history, physical, and imperical data, the patient meets criteria for observation/admission to the hospital.        Final diagnoses:   Acute urinary tract infection   Weakness        ED Disposition     ED Disposition   Decision to Admit    Condition   --    Comment   Level of Care: Remote Telemetry [26]   Diagnosis: UTI (urinary tract infection) [034914]   Admitting Physician: COTY COPPOLA [[id number]]               This medical record created using voice recognition software.             Fredy Harvey  03/05/23 1344       Eric Patton DO  03/09/23 1034

## 2023-03-05 NOTE — H&P
"[facility]   HISTORY AND PHYSICAL    Patient Name: Soraya Coker  : 10/27/1930  MRN: [id number]  Primary Care Physician:  Thomas Augustin MD  Date of admission: 3/5/2023    Subjective   Subjective     Chief Complaint:   Weakness  History of Present Illness  Patient is a 89-year-old female who was sent from the nursing home because of the patient says that she was placed in the nursing home after a hospitalization in [place].  She was supposed to be there for 3 weeks for rehabilitation but has been there for 6 weeks now.  Worsening weakness.  Patient has a history of CAD, CHF, diabetes mellitus, hypertension, and sick sinus syndrome status post pacemaker placement.  Patient says over the last week she has been increasingly weak and says that she has been \"just feeling awful\".  Here in the emergency department the patient was found to have a significant urinary tract infection.  The patient says that she has been battling UTIs for several years and that usually it takes \"medicine in the hospital\" in order to clear them up.  The ER doctor asked that we admit the patient at least overnight for further evaluation and more aggressive antibiotic treatment.      Review of Systems   Constitutional: Positive for fatigue.   All other systems reviewed and are negative.       Personal History     Past Medical History:   Diagnosis Date   • Arthritis    • CAD (coronary artery disease)    • CHF (congestive heart failure) (HCC)    • Diabetes mellitus (HCC)    • GERD (gastroesophageal reflux disease)    • Hyperlipidemia    • Hypertension    • Pneumonia    • Sick sinus syndrome (HCC)        Past Surgical History:   Procedure Laterality Date   • CARDIAC DEFIBRILLATOR PLACEMENT     • DACRYOCYSTORHINOSTOMY Right 10/13/2022    Procedure: RIGHT DACRYOCYSTORHINOSTOMY WITH CURTIS TUBE;  Surgeon: Ritchie Cardenas MD;  Location: [facility];  Service: Ophthalmology;  Laterality: Right;   • MASTECTOMY Left    • " REPLACEMENT TOTAL KNEE Bilateral        Family History: family history is not on file. Otherwise pertinent FHx was reviewed and not pertinent to current issue.    Social History:  reports that she has never smoked. She has never used smokeless tobacco. She reports that she does not currently use alcohol. She reports that she does not currently use drugs.    Home Medications:  Loratadine, acetaminophen, apixaban, atorvastatin, bisacodyl, brompheniramine-pseudoephedrine-DM, clotrimazole, diphenhydrAMINE, docusate sodium, erythromycin, furosemide, ipratropium-albuterol, lactobacillus, levothyroxine, neomycin-bacitracin-polymyxin, nystatin, polyethylene glycol, simethicone, and tobramycin    Allergies:  Allergies   Allergen Reactions   • Latex Other (See Comments)     Blisters  Blisters  Other reaction(s): Other (See Comments)  Blisters  Blisters  Blisters     • Adhesive Tape Rash       Objective    Objective     Vitals:   Temp:  [98.5 °F (36.9 °C)] 98.5 °F (36.9 °C)  Heart Rate:  [80-84] 80  Resp:  [20] 20  BP: (112-135)/(66-81) 112/81    Physical Exam  Constitutional:  Well-developed and well-nourished.  No acute distress.      HENT:  Head:  Normocephalic and atraumatic.  Mouth:  Moist mucous membranes.    Eyes:  Conjunctivae and EOM are normal. No scleral icterus.    Neck:  Neck supple.  No JVD present.    Cardiovascular:  Normal rate, regular rhythm and normal heart sounds with no murmur.  Pulmonary/Chest:  No respiratory distress, no wheezes, no crackles, with normal breath sounds and good air movement.  Abdominal:  Soft. No distension and no tenderness.   Musculoskeletal:  No tenderness, and no deformity.  No red or swollen joints anywhere.    Neurological:  Alert and oriented to person, place, and time.  No cranial nerve deficit.    Skin:  Skin is warm and dry. No rash noted. No pallor.   Peripheral vascular:  No clubbing, no cyanosis, no edema.  Psychiatric: Appropriate mood and affect    Result Review    Result  Review:  I have personally reviewed the results from the time of this admission to 3/5/2023 18:23 EST and agree with these findings:  [x]  Laboratory list / accordion  []  Microbiology  [x]  Radiology  [x]  EKG/Telemetry   []  Cardiology/Vascular   []  Pathology  []  Old records  []  Other:  Most notable findings include: Nitrate positive UTI      Assessment & Plan   Assessment / Plan     Brief Patient Summary:  Soraya Coker is a 89 y.o. female who presents to the emergency department with worsening weakness.  The patient's work-up has really only found a significant urinary tract infection.  Given the patient's age and history of complicated UTIs the ER doctor asked that we admit for further evaluation and treatment.  Active Hospital Problems:  1.  Complicated UTI  2.  Physical deconditioning  3.  Sick sinus syndrome status post pacemaker placement  4.  Poor appetite  5.  Diabetes mellitus  6.  Hypertension    Plan:   Patient will be admitted to the hospitalist service.  The patient was started on Rocephin IV in the emergency department which we will continue on the floor.  We will gently hydrate the patient but will have to be careful given the patient's history of CHF.  The main reason for hydration is that the patient says that she is nearly lost her appetite which she thinks is also contributing to her weakness overall.    DVT prophylaxis:  No DVT prophylaxis order currently exists.    CODE STATUS:    Level Of Support Discussed With: Patient  Code Status (Patient has no pulse and is not breathing): CPR (Attempt to Resuscitate)  Medical Interventions (Patient has pulse or is breathing): Full Support    Admission Status:  I believe this patient meets inpatient status.    Marshal Waller, DO

## 2023-03-06 LAB
ANION GAP SERPL CALCULATED.3IONS-SCNC: 7.9 MMOL/L (ref 5–15)
ANION GAP SERPL CALCULATED.3IONS-SCNC: 9.4 MMOL/L (ref 5–15)
BASOPHILS # BLD AUTO: 0.07 10*3/MM3 (ref 0–0.2)
BASOPHILS # BLD AUTO: 0.09 10*3/MM3 (ref 0–0.2)
BASOPHILS NFR BLD AUTO: 0.7 % (ref 0–1.5)
BASOPHILS NFR BLD AUTO: 0.9 % (ref 0–1.5)
BUN SERPL-MCNC: 13 MG/DL (ref 8–23)
BUN SERPL-MCNC: 13 MG/DL (ref 8–23)
BUN/CREAT SERPL: 26 (ref 7–25)
BUN/CREAT SERPL: 27.7 (ref 7–25)
CALCIUM SPEC-SCNC: 8.6 MG/DL (ref 8.2–9.6)
CALCIUM SPEC-SCNC: 8.7 MG/DL (ref 8.2–9.6)
CHLORIDE SERPL-SCNC: 95 MMOL/L (ref 98–107)
CHLORIDE SERPL-SCNC: 97 MMOL/L (ref 98–107)
CO2 SERPL-SCNC: 27.6 MMOL/L (ref 22–29)
CO2 SERPL-SCNC: 28.1 MMOL/L (ref 22–29)
CREAT SERPL-MCNC: 0.47 MG/DL (ref 0.57–1)
CREAT SERPL-MCNC: 0.5 MG/DL (ref 0.57–1)
DEPRECATED RDW RBC AUTO: 42.1 FL (ref 37–54)
DEPRECATED RDW RBC AUTO: 42.2 FL (ref 37–54)
EGFRCR SERPLBLD CKD-EPI 2021: 88.1 ML/MIN/1.73
EGFRCR SERPLBLD CKD-EPI 2021: 89.4 ML/MIN/1.73
EOSINOPHIL # BLD AUTO: 0.81 10*3/MM3 (ref 0–0.4)
EOSINOPHIL # BLD AUTO: 1.01 10*3/MM3 (ref 0–0.4)
EOSINOPHIL NFR BLD AUTO: 8.1 % (ref 0.3–6.2)
EOSINOPHIL NFR BLD AUTO: 9.7 % (ref 0.3–6.2)
ERYTHROCYTE [DISTWIDTH] IN BLOOD BY AUTOMATED COUNT: 13.8 % (ref 12.3–15.4)
ERYTHROCYTE [DISTWIDTH] IN BLOOD BY AUTOMATED COUNT: 13.8 % (ref 12.3–15.4)
GLUCOSE SERPL-MCNC: 129 MG/DL (ref 65–99)
GLUCOSE SERPL-MCNC: 161 MG/DL (ref 65–99)
HCT VFR BLD AUTO: 38 % (ref 34–46.6)
HCT VFR BLD AUTO: 39.7 % (ref 34–46.6)
HGB BLD-MCNC: 12.3 G/DL (ref 12–15.9)
HGB BLD-MCNC: 12.9 G/DL (ref 12–15.9)
IMM GRANULOCYTES # BLD AUTO: 0.03 10*3/MM3 (ref 0–0.05)
IMM GRANULOCYTES # BLD AUTO: 0.04 10*3/MM3 (ref 0–0.05)
IMM GRANULOCYTES NFR BLD AUTO: 0.3 % (ref 0–0.5)
IMM GRANULOCYTES NFR BLD AUTO: 0.4 % (ref 0–0.5)
LYMPHOCYTES # BLD AUTO: 1.73 10*3/MM3 (ref 0.7–3.1)
LYMPHOCYTES # BLD AUTO: 1.88 10*3/MM3 (ref 0.7–3.1)
LYMPHOCYTES NFR BLD AUTO: 17.2 % (ref 19.6–45.3)
LYMPHOCYTES NFR BLD AUTO: 18.1 % (ref 19.6–45.3)
MAGNESIUM SERPL-MCNC: 1.8 MG/DL (ref 1.7–2.3)
MCH RBC QN AUTO: 27.2 PG (ref 26.6–33)
MCH RBC QN AUTO: 27.2 PG (ref 26.6–33)
MCHC RBC AUTO-ENTMCNC: 32.4 G/DL (ref 31.5–35.7)
MCHC RBC AUTO-ENTMCNC: 32.5 G/DL (ref 31.5–35.7)
MCV RBC AUTO: 83.8 FL (ref 79–97)
MCV RBC AUTO: 83.9 FL (ref 79–97)
MONOCYTES # BLD AUTO: 0.92 10*3/MM3 (ref 0.1–0.9)
MONOCYTES # BLD AUTO: 1.28 10*3/MM3 (ref 0.1–0.9)
MONOCYTES NFR BLD AUTO: 12.3 % (ref 5–12)
MONOCYTES NFR BLD AUTO: 9.1 % (ref 5–12)
NEUTROPHILS NFR BLD AUTO: 58.6 % (ref 42.7–76)
NEUTROPHILS NFR BLD AUTO: 6.08 10*3/MM3 (ref 1.7–7)
NEUTROPHILS NFR BLD AUTO: 6.5 10*3/MM3 (ref 1.7–7)
NEUTROPHILS NFR BLD AUTO: 64.6 % (ref 42.7–76)
NRBC BLD AUTO-RTO: 0 /100 WBC (ref 0–0.2)
NRBC BLD AUTO-RTO: 0 /100 WBC (ref 0–0.2)
PLATELET # BLD AUTO: 344 10*3/MM3 (ref 140–450)
PLATELET # BLD AUTO: 352 10*3/MM3 (ref 140–450)
PMV BLD AUTO: 8.7 FL (ref 6–12)
PMV BLD AUTO: 8.7 FL (ref 6–12)
POTASSIUM SERPL-SCNC: 3.9 MMOL/L (ref 3.5–5.2)
POTASSIUM SERPL-SCNC: 4 MMOL/L (ref 3.5–5.2)
RBC # BLD AUTO: 4.53 10*6/MM3 (ref 3.77–5.28)
RBC # BLD AUTO: 4.74 10*6/MM3 (ref 3.77–5.28)
SODIUM SERPL-SCNC: 131 MMOL/L (ref 136–145)
SODIUM SERPL-SCNC: 134 MMOL/L (ref 136–145)
WBC NRBC COR # BLD: 10.06 10*3/MM3 (ref 3.4–10.8)
WBC NRBC COR # BLD: 10.38 10*3/MM3 (ref 3.4–10.8)

## 2023-03-06 PROCEDURE — G0378 HOSPITAL OBSERVATION PER HR: HCPCS

## 2023-03-06 PROCEDURE — 85025 COMPLETE CBC W/AUTO DIFF WBC: CPT | Performed by: FAMILY MEDICINE

## 2023-03-06 PROCEDURE — 85025 COMPLETE CBC W/AUTO DIFF WBC: CPT | Performed by: INTERNAL MEDICINE

## 2023-03-06 PROCEDURE — 25010000002 CEFTRIAXONE PER 250 MG: Performed by: FAMILY MEDICINE

## 2023-03-06 PROCEDURE — 80048 BASIC METABOLIC PNL TOTAL CA: CPT | Performed by: INTERNAL MEDICINE

## 2023-03-06 PROCEDURE — 83735 ASSAY OF MAGNESIUM: CPT | Performed by: FAMILY MEDICINE

## 2023-03-06 PROCEDURE — 97161 PT EVAL LOW COMPLEX 20 MIN: CPT

## 2023-03-06 PROCEDURE — 80048 BASIC METABOLIC PNL TOTAL CA: CPT | Performed by: FAMILY MEDICINE

## 2023-03-06 PROCEDURE — 99233 SBSQ HOSP IP/OBS HIGH 50: CPT | Performed by: INTERNAL MEDICINE

## 2023-03-06 PROCEDURE — 94799 UNLISTED PULMONARY SVC/PX: CPT

## 2023-03-06 RX ORDER — BISACODYL 5 MG/1
5 TABLET, DELAYED RELEASE ORAL DAILY PRN
Status: DISCONTINUED | OUTPATIENT
Start: 2023-03-06 | End: 2023-03-10 | Stop reason: HOSPADM

## 2023-03-06 RX ORDER — IBUPROFEN 200 MG
1 CAPSULE ORAL DAILY PRN
Status: DISCONTINUED | OUTPATIENT
Start: 2023-03-06 | End: 2023-03-10 | Stop reason: HOSPADM

## 2023-03-06 RX ORDER — ATORVASTATIN CALCIUM 40 MG/1
40 TABLET, FILM COATED ORAL NIGHTLY
Status: DISCONTINUED | OUTPATIENT
Start: 2023-03-06 | End: 2023-03-10 | Stop reason: HOSPADM

## 2023-03-06 RX ORDER — CETIRIZINE HYDROCHLORIDE 10 MG/1
10 TABLET ORAL DAILY
Status: DISCONTINUED | OUTPATIENT
Start: 2023-03-06 | End: 2023-03-10 | Stop reason: HOSPADM

## 2023-03-06 RX ORDER — FUROSEMIDE 40 MG/1
40 TABLET ORAL
Status: DISCONTINUED | OUTPATIENT
Start: 2023-03-06 | End: 2023-03-10 | Stop reason: HOSPADM

## 2023-03-06 RX ORDER — PROBIOTIC PRODUCT - TAB 1B-250 MG
1 TAB ORAL DAILY
Status: DISCONTINUED | OUTPATIENT
Start: 2023-03-06 | End: 2023-03-10 | Stop reason: HOSPADM

## 2023-03-06 RX ORDER — SENNOSIDES 8.6 MG
650 CAPSULE ORAL EVERY 8 HOURS PRN
Status: DISCONTINUED | OUTPATIENT
Start: 2023-03-06 | End: 2023-03-10 | Stop reason: HOSPADM

## 2023-03-06 RX ORDER — L.ACID,PARA/B.BIFIDUM/S.THERM 8B CELL
1 CAPSULE ORAL DAILY
Status: DISCONTINUED | OUTPATIENT
Start: 2023-03-06 | End: 2023-03-06

## 2023-03-06 RX ORDER — DOCUSATE SODIUM 100 MG/1
100 CAPSULE, LIQUID FILLED ORAL 2 TIMES DAILY
Status: DISCONTINUED | OUTPATIENT
Start: 2023-03-06 | End: 2023-03-10 | Stop reason: HOSPADM

## 2023-03-06 RX ORDER — L. ACIDOPHILUS/L.BULGARICUS 1MM CELL
1 TABLET ORAL DAILY
Status: DISCONTINUED | OUTPATIENT
Start: 2023-03-06 | End: 2023-03-06

## 2023-03-06 RX ORDER — POTASSIUM CHLORIDE 1.5 G/1.77G
20 POWDER, FOR SOLUTION ORAL DAILY
Status: DISCONTINUED | OUTPATIENT
Start: 2023-03-06 | End: 2023-03-10 | Stop reason: HOSPADM

## 2023-03-06 RX ORDER — SIMETHICONE 80 MG
125 TABLET,CHEWABLE ORAL EVERY 6 HOURS PRN
Status: DISCONTINUED | OUTPATIENT
Start: 2023-03-06 | End: 2023-03-10 | Stop reason: HOSPADM

## 2023-03-06 RX ORDER — LEVOTHYROXINE SODIUM 0.15 MG/1
150 TABLET ORAL EVERY MORNING
Status: DISCONTINUED | OUTPATIENT
Start: 2023-03-06 | End: 2023-03-10 | Stop reason: HOSPADM

## 2023-03-06 RX ORDER — ONDANSETRON 4 MG/1
4 TABLET, FILM COATED ORAL EVERY 8 HOURS PRN
Status: DISCONTINUED | OUTPATIENT
Start: 2023-03-06 | End: 2023-03-10 | Stop reason: HOSPADM

## 2023-03-06 RX ORDER — POLYETHYLENE GLYCOL 3350 17 G/17G
17 POWDER, FOR SOLUTION ORAL DAILY
Status: DISCONTINUED | OUTPATIENT
Start: 2023-03-06 | End: 2023-03-10 | Stop reason: HOSPADM

## 2023-03-06 RX ORDER — IPRATROPIUM BROMIDE AND ALBUTEROL SULFATE 2.5; .5 MG/3ML; MG/3ML
3 SOLUTION RESPIRATORY (INHALATION) EVERY 4 HOURS PRN
Status: DISCONTINUED | OUTPATIENT
Start: 2023-03-06 | End: 2023-03-10 | Stop reason: HOSPADM

## 2023-03-06 RX ADMIN — POLYETHYLENE GLYCOL 3350 17 G: 17 POWDER, FOR SOLUTION ORAL at 10:37

## 2023-03-06 RX ADMIN — APIXABAN 2.5 MG: 2.5 TABLET, FILM COATED ORAL at 21:18

## 2023-03-06 RX ADMIN — CEFTRIAXONE SODIUM 1 G: 1 INJECTION, SOLUTION INTRAVENOUS at 17:31

## 2023-03-06 RX ADMIN — FUROSEMIDE 40 MG: 40 TABLET ORAL at 10:36

## 2023-03-06 RX ADMIN — LEVOTHYROXINE SODIUM 150 MCG: 0.15 TABLET ORAL at 10:37

## 2023-03-06 RX ADMIN — PROBIOTIC PRODUCT - TAB 1 TABLET: TAB at 12:44

## 2023-03-06 RX ADMIN — CETIRIZINE HYDROCHLORIDE 10 MG: 10 TABLET, FILM COATED ORAL at 10:36

## 2023-03-06 RX ADMIN — Medication 10 ML: at 08:31

## 2023-03-06 RX ADMIN — APIXABAN 2.5 MG: 2.5 TABLET, FILM COATED ORAL at 10:38

## 2023-03-06 RX ADMIN — ATORVASTATIN CALCIUM 40 MG: 40 TABLET, FILM COATED ORAL at 21:18

## 2023-03-06 RX ADMIN — POTASSIUM CHLORIDE 20 MEQ: 1.5 POWDER, FOR SOLUTION ORAL at 10:37

## 2023-03-06 RX ADMIN — DOCUSATE SODIUM 100 MG: 100 CAPSULE, LIQUID FILLED ORAL at 10:36

## 2023-03-06 RX ADMIN — MICONAZOLE NITRATE 1 APPLICATION: 20 CREAM TOPICAL at 15:30

## 2023-03-06 RX ADMIN — FUROSEMIDE 40 MG: 40 TABLET ORAL at 17:31

## 2023-03-06 RX ADMIN — Medication 10 ML: at 21:19

## 2023-03-06 NOTE — PLAN OF CARE
Goal Outcome Evaluation:  Plan of Care Reviewed With: patient           Outcome Evaluation: Pt. presents with significant bilateral LE weakness that impairs her ability to perform functional mobility safely and independently. Recommend subacute rehab upon hospital discharge to improve strength deficits and optimize functional independence.

## 2023-03-06 NOTE — CONSULTS
"Nutrition Services    Patient Name: Soraya Coker  YOB: 1930  MRN: [id number]  Admission date: 3/5/2023      CLINICAL NUTRITION ASSESSMENT      Reason for Assessment  Identified at risk by screening criteria, MST score 2+, Reduced oral intake   H&P:    Past Medical History:   Diagnosis Date   • Arthritis    • CAD (coronary artery disease)    • CHF (congestive heart failure) (HCC)    • Diabetes mellitus (HCC)    • GERD (gastroesophageal reflux disease)    • Hyperlipidemia    • Hypertension    • Pneumonia    • Sick sinus syndrome (HCC)         Current Problems:   Active Hospital Problems    Diagnosis    • **UTI (urinary tract infection)         Nutrition/Diet History         Narrative     RD consult for MST score of 3, unsure of wt loss.  Pt also reports decreased appetite resulting increased weakness per H&P.  Pt normally resides at a nursing home but currently admitted for a UTI.  Per chart review pt has lost 10# since October, a 5.5% change, not consider clinically significant at this time.  RD made to attempts to visit pt this morning but at one time pt sleeping & the other physician was in with patient.  Will go ahead & start to on oral nutrition supplement of Boost Glucose Control to add extra calories/protein & monitoring for tolerance.      Anthropometrics        Current Height, Weight Height: 157.5 cm (62.01\")  Weight: 78.1 kg (172 lb 2.9 oz)   Current BMI Body mass index is 31.48 kg/m².       Weight Hx  Wt Readings from Last 30 Encounters:   03/05/23 2027 78.1 kg (172 lb 2.9 oz)   03/05/23 1915 78.1 kg (172 lb 2.9 oz)   03/05/23 1311 79.3 kg (174 lb 13.2 oz)   10/13/22 0732 82.6 kg (182 lb)   09/09/22 1201 88.8 kg (195 lb 12.3 oz)            Wt Change Observation 10# since October, ~5.5%     Estimated/Assessed Needs       Energy Requirements    EST Needs (kcal/day) 2092-9680 (MSJ *1.4-1.5)       Protein Requirements    EST Daily Needs (g/day) 78 (1 gm)       Fluid Requirements     Estimated " Needs (mL/day) 1602     Labs/Medications         Pertinent Labs Reviewed.   Results from last 7 days   Lab Units 03/06/23  0603 03/05/23  1451   SODIUM mmol/L 134* 131*   POTASSIUM mmol/L 3.9 4.0   CHLORIDE mmol/L 97* 92*   CO2 mmol/L 27.6 29.1*   BUN mg/dL 13 13   CREATININE mg/dL 0.47* 0.54*   CALCIUM mg/dL 8.6 8.9   BILIRUBIN mg/dL  --  0.6   ALK PHOS U/L  --  125*   ALT (SGPT) U/L  --  11   AST (SGOT) U/L  --  17   GLUCOSE mg/dL 129* 133*     Results from last 7 days   Lab Units 03/06/23  0603 03/05/23  1451   MAGNESIUM mg/dL 1.8 1.7   HEMOGLOBIN g/dL 12.3 13.2   HEMATOCRIT % 38.0 40.7       Pertinent Medications Reviewed.     Current Nutrition Orders & Evaluation of Intake       Oral Nutrition     Current PO Diet Diet: Diabetic Diets; Consistent Carbohydrate; Texture: Regular Texture (IDDSI 7); Fluid Consistency: Thin (IDDSI 0)   Supplement No active supplement orders       Nutrition Diagnosis         Nutrition Dx Problem 1 Inadequate energy Intake related to inadequate oral Intake as evidenced by decreased appetite. and unintended wt change.     Nutrition Intervention         Continue carb consistent, heart healthy diet  Add Boost Glucose Control TID (570 kcal & 28 gm protein)     Medical Nutrition Therapy/Nutrition Education          Learner     Readiness Patient  N/A     Method     Response N/A  N/A     Monitor/Evaluation        Monitor PO intake, Supplement intake, Pertinent labs, Weight     Nutrition Discharge Plan         To be determined     Electronically signed by:  Tiesha Milner RD  03/06/23 10:03 EST

## 2023-03-06 NOTE — THERAPY EVALUATION
Acute Care - Physical Therapy Initial Evaluation   [facility]     Patient Name: Soraya Coker  : 10/27/1930  MRN: [id number]  Today's Date: 3/6/2023      Visit Dx:     ICD-10-CM ICD-9-CM   1. Acute urinary tract infection  N39.0 599.0   2. Weakness  R53.1 780.79   3. Difficulty in walking  R26.2 719.7     Patient Active Problem List   Diagnosis   • UTI (urinary tract infection)     Past Medical History:   Diagnosis Date   • Arthritis    • CAD (coronary artery disease)    • CHF (congestive heart failure) ([facility])    • Diabetes mellitus (HCC)    • GERD (gastroesophageal reflux disease)    • Hyperlipidemia    • Hypertension    • Pneumonia    • Sick sinus syndrome ([facility])      Past Surgical History:   Procedure Laterality Date   • CARDIAC DEFIBRILLATOR PLACEMENT     • DACRYOCYSTORHINOSTOMY Right 10/13/2022    Procedure: RIGHT DACRYOCYSTORHINOSTOMY WITH CURTIS TUBE;  Surgeon: Ritchie Cardenas MD;  Location: [facility];  Service: Ophthalmology;  Laterality: Right;   • MASTECTOMY Left    • REPLACEMENT TOTAL KNEE Bilateral      PT Assessment (last 12 hours)     PT Evaluation and Treatment     Row Name 23 1418          Physical Therapy Time and Intention    Subjective Information complains of;weakness  -BRENT     Document Type evaluation  -BRENT     Mode of Treatment individual therapy;physical therapy  -BRENT     Patient Effort good  -BRNET     Row Name 23 1418          General Information    Patient Profile Reviewed yes  -BRENT     Patient Observations alert;cooperative;agree to therapy  -BRENT     Prior Level of Function mod assist:  faciltiy resident  -BRENT     Equipment Currently Used at Home cane, straight;rollator;wheelchair, motorized  -BRENT     Existing Precautions/Restrictions fall  -BRENT     Barriers to Rehab none identified  -BRENT     Row Name 23 1418          Living Environment    Current Living Arrangements other (see comments)  Pt. was at [facility] rehab prior to hospital admission  -BRENT     Primary Care Provided  by self;other (see comments)  facility staff  -BRENT     Row Name 03/06/23 1418          Home Use of Assistive/Adaptive Equipment    Equipment Currently Used at Home walker, rolling;wheelchair, motorized  -BRENT     Row Name 03/06/23 1418          Range of Motion (ROM)    Range of Motion ROM is WFL;bilateral lower extremities  -BRENT     Row Name 03/06/23 1418          Strength (Manual Muscle Testing)    Strength (Manual Muscle Testing) bilateral lower extremities  3/5 all planes  -BRENT     Row Name 03/06/23 1418          Bed Mobility    Bed Mobility supine-sit-supine  -BRENT     Supine-Sit-Supine Hodgeman (Bed Mobility) minimum assist (75% patient effort);1 person assist  -BRENT     Bed Mobility, Safety Issues decreased use of arms for pushing/pulling;decreased use of legs for bridging/pushing  -BRENT     Assistive Device (Bed Mobility) bed rails;head of bed elevated;draw sheet  -BRENT     Row Name 03/06/23 1418          Safety Issues, Functional Mobility    Impairments Affecting Function (Mobility) balance;endurance/activity tolerance;strength  -BRENT     Row Name 03/06/23 1418          Balance    Balance Assessment sitting dynamic balance  -BRENT     Dynamic Sitting Balance contact guard  -BRENT     Position, Sitting Balance sitting edge of bed  -BRENT     Row Name 03/06/23 1418          Plan of Care Review    Plan of Care Reviewed With patient  -BRENT     Outcome Evaluation Pt. presents with significant bilateral LE weakness that impairs her ability to perform functional mobility safely and independently. Recommend subacute rehab upon hospital discharge to improve strength deficits and optimize functional independence.  -BRENT     Row Name 03/06/23 1418          Positioning and Restraints    Pre-Treatment Position in bed  -BRENT     Post Treatment Position bed  -BRENT     In Bed supine;call light within reach;encouraged to call for assist  -BRENT     Row Name 03/06/23 1418          Therapy Assessment/Plan (PT)    Rehab Potential (PT) good, to achieve stated  therapy goals  -BRENT     Criteria for Skilled Interventions Met (PT) yes  -BRENT     Therapy Frequency (PT) daily  -BRENT     Predicted Duration of Therapy Intervention (PT) 10 days  -BRENT     Problem List (PT) problems related to;balance;mobility;strength  -BRENT     Activity Limitations Related to Problem List (PT) unable to ambulate safely;unable to transfer safely  -BRENT     Row Name 03/06/23 1418          Therapy Plan Review/Discharge Plan (PT)    Therapy Plan Review (PT) evaluation/treatment results reviewed;patient  -BRENT     Row Name 03/06/23 1418          Physical Therapy Goals    Bed Mobility Goal Selection (PT) bed mobility, PT goal 1  -BRENT     Transfer Goal Selection (PT) transfer, PT goal 1  -BRENT     Gait Training Goal Selection (PT) gait training, PT goal 1  -BRENT     Strength Goal Selection (PT) strength, PT goal 1  -BRENT     Row Name 03/06/23 1418          Bed Mobility Goal 1 (PT)    Activity/Assistive Device (Bed Mobility Goal 1, PT) bed mobility activities, all  -BRENT     [place] Level/Cues Needed (Bed Mobility Goal 1, PT) standby assist  -BRENT     Time Frame (Bed Mobility Goal 1, PT) long term goal (LTG);10 days  -BRENT     Row Name 03/06/23 1418          Transfer Goal 1 (PT)    Activity/Assistive Device (Transfer Goal 1, PT) sit-to-stand/stand-to-sit;walker, rolling  -BRENT     [place]field Level/Cues Needed (Transfer Goal 1, PT) minimum assist (75% or more patient effort)  -BRENT     Time Frame (Transfer Goal 1, PT) long term goal (LTG);10 days  -BRENT     Row Name 03/06/23 1418          Gait Training Goal 1 (PT)    Activity/Assistive Device (Gait Training Goal 1, PT) gait (walking locomotion);walker, rolling  -BRENT     [place]field Level (Gait Training Goal 1, PT) minimum assist (75% or more patient effort)  -BRENT     Distance (Gait Training Goal 1, PT) 15  -BRENT     Time Frame (Gait Training Goal 1, PT) long term goal (LTG);10 days  -BRENT     Row Name 03/06/23 1418          Strength Goal 1 (PT)    Strength Goal 1 (PT) Pt. will improve  bilateral knee extensor strength to 4/5.  -BRENT     Time Frame (Strength Goal 1, PT) long term goal (LTG);10 days  -BRENT           User Key  (r) = Recorded By, (t) = Taken By, (c) = Cosigned By    Initials Name Provider Type    Adalberto Elizalde, PT Physical Therapist                Physical Therapy Education     Title: PT OT SLP Therapies (Done)     Topic: Physical Therapy (Done)     Point: Mobility training (Done)     Learning Progress Summary           Patient Acceptance, E, VU by BRENT at 3/6/2023 1425                               User Key     Initials Effective Dates Name Provider Type Discipline    BRENT 06/03/21 -  Adalberto Tyson PT Physical Therapist PT              PT Recommendation and Plan  Anticipated Discharge Disposition (PT): sub acute care setting  Planned Therapy Interventions (PT): balance training, bed mobility training, gait training, patient/family education, strengthening, transfer training  Therapy Frequency (PT): daily  Plan of Care Reviewed With: patient  Outcome Evaluation: Pt. presents with significant bilateral LE weakness that impairs her ability to perform functional mobility safely and independently. Recommend subacute rehab upon hospital discharge to improve strength deficits and optimize functional independence.   Outcome Measures     Row Name 03/06/23 1424             How much help from another person do you currently need...    Turning from your back to your side while in flat bed without using bedrails? 3  -BRENT      Moving from lying on back to sitting on the side of a flat bed without bedrails? 3  -BRENT      Moving to and from a bed to a chair (including a wheelchair)? 1  -BRENT      Standing up from a chair using your arms (e.g., wheelchair, bedside chair)? 1  -BRENT      Climbing 3-5 steps with a railing? 1  -BRENT      To walk in hospital room? 1  -BRENT      AM-PAC 6 Clicks Score (PT) 10  -BRENT         Functional Assessment    Outcome Measure Options AM-PAC 6 Clicks Basic Mobility (PT)  -BRENT             User Key  (r) = Recorded By, (t) = Taken By, (c) = Cosigned By    Initials Name Provider Type    Adalberto Elizalde, PT Physical Therapist                 Time Calculation:    PT Charges     Row Name 03/06/23 1417             Time Calculation    PT Received On 03/06/23  -BRENT      PT Goal Re-Cert Due Date 03/15/23  -BRENT         Untimed Charges    PT Eval/Re-eval Minutes 25  -BRENT         Total Minutes    Untimed Charges Total Minutes 25  -BRENT       Total Minutes 25  -BRENT            User Key  (r) = Recorded By, (t) = Taken By, (c) = Cosigned By    Initials Name Provider Type    Adalberto Elizalde, PT Physical Therapist              Therapy Charges for Today     Code Description Service Date Service Provider Modifiers Qty    47211064137 HC PT EVAL LOW COMPLEXITY 2 3/6/2023 Adalberto Tyson PT GP 1          PT G-Codes  Outcome Measure Options: AM-PAC 6 Clicks Basic Mobility (PT)  AM-PAC 6 Clicks Score (PT): 10    Adalberto Tyson PT  3/6/2023

## 2023-03-06 NOTE — PLAN OF CARE
Goal Outcome Evaluation:  Plan of Care Reviewed With: patient        Progress: no change  Outcome Evaluation: patient is alert and oriented this shift but is forgetful. no c/o pain. iv antibiotics given. wound care photos taken and WOCRN consulted. turned and repositioned to prevent further skin breakdown. no other changes at this time.

## 2023-03-06 NOTE — PROGRESS NOTES
"[facility] Progress Note      Patient Name:  Soraya Coker   MRN:  [id number]   :  10/27/1930   Date of Admission:  3/5/2023   Date of Service:  3/6/2023   PMD:  Thomas Augustin MD     Hospital Course:    89-year-old female who was sent from the nursing home because of the patient says that she was placed in the nursing home after a hospitalization in [place].  She was supposed to be there for 3 weeks for rehabilitation but has been there for 6 weeks now.  Worsening weakness.  Patient has a history of CAD, CHF, diabetes mellitus, hypertension, and sick sinus syndrome status post pacemaker placement.  Patient says over the last week she has been increasingly weak and says that she has been \"just feeling awful\".  Here in the emergency department the patient was found to have a significant urinary tract infection.  The patient says that she has been battling UTIs for several years and that usually it takes \"medicine in the hospital\" in order to clear them up.  The ER doctor asked that we admit the patient at least overnight for further evaluation and more aggressive antibiotic treatment.       Consultants:        Procedures:  Chest x-ray    Anti-infectives:    Rocephin    2023    Chief Complaint:  Follow-up of patient with complicated UTI with generalized weakness and fatigue    Subjective:   Improved weakness and fatigue, started on IV antibiotics follow-up cultures.  Urine culture positive for gram-negative    Review Of Systems:  GENERAL:  No weakness , no fatigue, no time is denies any weight loss appetite is normal.  HEENT:  Denies any rhinitis, no sore throat, no diplopia , hearing is normal  Respiratory:  Denies any shortness of breath , sweating d, yspnea on exertion , cough or sputum.  CVS:  Denies any chest pain , palpitation or syncope.  Gi:  No nausea, no vomiting, no diarrhea, no hematemesis , no melena , no rectal bleeding  :  Complains of dysuria " frequency , no hematuria, no retention:  Musculoskeletal:  Denies any arthritis, or calf pain    Hematological:  No bleeding , no petechiae.  Skin:  Denies rash , pruritus , jaundice  Endocrine:  No polyuria , polydipsia , polyphagia.  CNS:  Denies any confusion , headache , or seizure disorder  Psychiatry:  No anxiety , or depression, no suicide ideation      Objective:  Vitals:    03/06/23 0430 03/06/23 0715 03/06/23 1141 03/06/23 1158   BP: 118/50 108/60 110/62    BP Location: Left arm Left arm Left arm    Patient Position: Lying Lying Lying    Pulse: 78      Resp: 18 18 20    Temp: 97.7 °F (36.5 °C) 97.9 °F (36.6 °C) 99 °F (37.2 °C)    TempSrc: Oral Oral Oral    SpO2: 95% 94% 99% 98%   Weight:       Height:              Physical Exam:  GENERAL APPEARANCE: Alert and oriented.  Appears comfortable.  No acute distress  HEENT: Atraumatic, normocephalic,  PERRLA, mucous membranes moist  NECK: supple; no JVD or thyromegaly noted  CARDIOVASCULAR: Regular rate and rhythm, no murmurs appreciated; no edema present in BLE   RESPIRATORY: good air entry bilaterally.  No wheezes, rhonchi, or rales appreciated  GASTROINTESTINAL:  Abdomen  soft; bowel sounds present, non-tender, non-distended, no organomegaly, no CVA tenderness   EXTREMITIES: Pulses equal bilaterally   MUSCULOSKELETAL:  Good muscle strength noted no obvious deformity appreciat  PSYCH: Appropriate mood and affect  Neurologic:  Alert & oriented x 3.  Normal Mental status.  Normal Cranial Nervies.  EOMI.  DIMAS.  Normal 5/5 muscular strength in both upper and lower extremities.  Normal sensation.  Normal and symmetric reflexes. Normal cerbellar function.  Normal Gait. Negative Babinski.    Labs Reviewed  CBC:    Lab Results   Component Value Date    WBC 10.06 03/06/2023    HGB 12.9 03/06/2023    HCT 39.7 03/06/2023    MCV 83.8 03/06/2023     CMP:    Lab Results   Component Value Date     (L) 03/06/2023    CO2 28.1 03/06/2023    GLUCOSE 161 (H) 03/06/2023     BUN 13 03/06/2023    CREATININE 0.50 (L) 03/06/2023    ALBUMIN 3.5 03/05/2023    CALCIUM 8.7 03/06/2023    AST 17 03/05/2023    ALT 11 03/05/2023     Lipis Panel: No results found for: CHOL, HDL   INR:    Lab Results   Component Value Date    INR 1.0 11/03/2020     Cardiac:  No results found for: CKMB, TROPONIN  No results found for: BNP  Lactate:    Lab Results   Component Value Date    LACTATE 1.2 03/05/2023    LACTATE 1.0 09/09/2022     Blood Culture:  @lastlabx(cult:2)@    Imaging:  XR Chest 1 View    Result Date: 3/5/2023  Narrative: PROCEDURE: XR CHEST 1 VW  COMPARISON: [facility], , CHEST PA/AP & LAT 2V, 2/21/2017, 11:16.  INDICATIONS: Weak/Dizzy/AMS triage protocol  FINDINGS:  In the interval left subclavian transvenous pacemaker generator pack has been exchanged.  The heart size is at the upper limits of normal.  Pulmonary vessels are normal.  Calcified granuloma seen within the right lung base.  Lungs are otherwise clear.  No pleural effusion or pneumothorax.  There are surgical clips in left axilla.      Impression:   1. 1. Interval exchange of left subclavian transvenous pacemaker pack.  2. No acute cardiopulmonary disease.  No evidence of pneumothorax.       ZOHRA MORILLO MD       Electronically Signed and Approved By: ZOHRA MORILLO MD on 3/05/2023 at 13:52                Medications Reviewed:  apixaban, 2.5 mg, Oral, BID  atorvastatin, 40 mg, Oral, Nightly  cefTRIAXone, 1 g, Intravenous, Q24H  cetirizine, 10 mg, Oral, Daily  docusate sodium, 100 mg, Oral, BID  furosemide, 40 mg, Oral, BID  levothyroxine, 150 mcg, Oral, QAM  miconazole, 1 application, Topical, Q24H  polyethylene glycol, 17 g, Oral, Daily  potassium chloride, 20 mEq, Oral, Daily  Tiny-Bid Probiotic, 1 tablet, Oral, Daily  sodium chloride, 10 mL, Intravenous, Q12H         Assessment/Plan:    Patient Active Problem List    Diagnosis    • *UTI (urinary tract infection) [N39.0]       Medical decision making:    Complicated  UTI:  Follow urine culture shows.    \Continue IV Rocephin for gram-negative.    Atrial fibrillation with sick sinus syndrome status post pacemaker:  Continue to monitor  Continue home medication    Type 2 diabetes mellitus:  Blood sugar ACHS  Sliding-scale.  Continue home medication    Hypertension:  Monitor blood pressure closely  Continue home antihypertensive    DVT Prophylaxis:    Lovenox  CODE STATUS:    Reason for continued hospitalization  and medical necessity   patient with complicated UTI and recent fatigue requiring further management    Orders:  CBC  CMP  Eliquis  Rocephin  Urine culture          Time spent:  39+ minute not only  including face-to-face rounding putting in the orders writing the note and all the conversation reviewing the records    This transcription was electronically signed.         Disposition:  Nursing home    Diet: Cardiac      Discussed with: Patient in detail      This has been electronically signed by:  _______________________________    Joie Deal [email or web address]     At [facility], we believe that sharing information builds trust and better relationships. You are receiving this note because you recently visited [facility]. It is possible you will see health information before a provider has talked with you about it. This kind of information can be easy to misunderstand. To help you fully understand what it means for your health, we urge you to discuss this note with your provider.           Part of this note may be an electronic transcription/translation of spoken language to printed ,text using the Dragon Dictation System.

## 2023-03-06 NOTE — DISCHARGE PLACEMENT REQUEST
"Angela Coker (89 y.o. Female)     Date of Birth   10/27/1930    Social Security Number       Address   [address] [place] [address]    Home Phone   [phone]    MRN   [id number]       Scientologist   None    Marital Status                               Admission Date   3/5/23    Admission Type   Emergency    Admitting Provider   Marshal Waller DO    Attending Provider   Joie Martini MD    Department, Room/Bed   [address], [facility]/       Discharge Date       Discharge Disposition       Discharge Destination                               Attending Provider: Joie Martini MD    Allergies: Latex, Adhesive Tape    Isolation: Contact   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)   Code Status: CPR    Ht: 157.5 cm (62.01\")   Wt: 78.1 kg (172 lb 2.9 oz)    Admission Cmt: None   Principal Problem: UTI (urinary tract infection) [N39.0]                 Active Insurance as of 3/5/2023     Primary Coverage     Payor Plan Insurance Group Employer/Plan Group    MEDICARE MEDICARE A & B      Payor Plan Address Payor Plan Phone Number Payor Plan Fax Number Effective Dates    [address] [phone]  10/1/1995 - None Entered    [facility] [id number]       Subscriber Name Subscriber Birth Date Member ID       ANGELA COKER 10/27/1930 [id number]           Secondary Coverage     Payor Plan Insurance Group Employer/Plan Group    [facility] [employer] [facility]      Payor Plan Address Payor Plan Phone Number Payor Plan Fax Number Effective Dates    [address] [place] [phone]  10/1/1995 - None Entered    [facility] [id number]       Subscriber Name Subscriber Birth Date Member ID       MARIANELAANGELA 10/27/1930 [id number]                 Emergency Contacts      (Rel.) Home Phone Work Phone Mobile Phone    Joyce Drew (Daughter) -- -- [phone]            Emergency Contact Information     Name Relation Home Work Mobile    Joyce Drew Daughter   [phone]          Insurance " "Information                MEDICARE/MEDICARE A & B Phone: [phone]    Subscriber: Soraya Coker Subscriber#: [id number]    Group#: -- Precert#: --        [employer]UAL  [facility] Phone: [phone]    Subscriber: Soraya Coker Subscriber#: [id number]    Group#: -- Precert#: --             History & Physical      SunapeeMarshal DO at 23 1823          [facility]   HISTORY AND PHYSICAL    Patient Name: Soraya Coker  : 10/27/1930  MRN: [id number]  Primary Care Physician:  Thomas Augustin MD  Date of admission: 3/5/2023    Subjective   Subjective     Chief Complaint:   Weakness  History of Present Illness  Patient is a 89-year-old female who was sent from the nursing home because of the patient says that she was placed in the nursing home after a hospitalization in [place].  She was supposed to be there for 3 weeks for rehabilitation but has been there for 6 weeks now.  Worsening weakness.  Patient has a history of CAD, CHF, diabetes mellitus, hypertension, and sick sinus syndrome status post pacemaker placement.  Patient says over the last week she has been increasingly weak and says that she has been \"just feeling awful\".  Here in the emergency department the patient was found to have a significant urinary tract infection.  The patient says that she has been battling UTIs for several years and that usually it takes \"medicine in the hospital\" in order to clear them up.  The ER doctor asked that we admit the patient at least overnight for further evaluation and more aggressive antibiotic treatment.      Review of Systems   Constitutional: Positive for fatigue.   All other systems reviewed and are negative.       Personal History     Past Medical History:   Diagnosis Date   • Arthritis    • CAD (coronary artery disease)    • CHF (congestive heart failure) (HCC)    • Diabetes mellitus (HCC)    • GERD (gastroesophageal reflux disease)    • Hyperlipidemia    • Hypertension    • " Pneumonia    • Sick sinus syndrome (HCC)        Past Surgical History:   Procedure Laterality Date   • CARDIAC DEFIBRILLATOR PLACEMENT     • DACRYOCYSTORHINOSTOMY Right 10/13/2022    Procedure: RIGHT DACRYOCYSTORHINOSTOMY WITH CURTIS TUBE;  Surgeon: Ritchie Cardenas MD;  Location: [facility];  Service: Ophthalmology;  Laterality: Right;   • MASTECTOMY Left    • REPLACEMENT TOTAL KNEE Bilateral        Family History: family history is not on file. Otherwise pertinent FHx was reviewed and not pertinent to current issue.    Social History:  reports that she has never smoked. She has never used smokeless tobacco. She reports that she does not currently use alcohol. She reports that she does not currently use drugs.    Home Medications:  Loratadine, acetaminophen, apixaban, atorvastatin, bisacodyl, brompheniramine-pseudoephedrine-DM, clotrimazole, diphenhydrAMINE, docusate sodium, erythromycin, furosemide, ipratropium-albuterol, lactobacillus, levothyroxine, neomycin-bacitracin-polymyxin, nystatin, polyethylene glycol, simethicone, and tobramycin    Allergies:  Allergies   Allergen Reactions   • Latex Other (See Comments)     Blisters  Blisters  Other reaction(s): Other (See Comments)  Blisters  Blisters  Blisters     • Adhesive Tape Rash       Objective    Objective     Vitals:   Temp:  [98.5 °F (36.9 °C)] 98.5 °F (36.9 °C)  Heart Rate:  [80-84] 80  Resp:  [20] 20  BP: (112-135)/(66-81) 112/81    Physical Exam  Constitutional:  Well-developed and well-nourished.  No acute distress.      HENT:  Head:  Normocephalic and atraumatic.  Mouth:  Moist mucous membranes.    Eyes:  Conjunctivae and EOM are normal. No scleral icterus.    Neck:  Neck supple.  No JVD present.    Cardiovascular:  Normal rate, regular rhythm and normal heart sounds with no murmur.  Pulmonary/Chest:  No respiratory distress, no wheezes, no crackles, with normal breath sounds and good air movement.  Abdominal:  Soft. No distension and no  tenderness.   Musculoskeletal:  No tenderness, and no deformity.  No red or swollen joints anywhere.    Neurological:  Alert and oriented to person, place, and time.  No cranial nerve deficit.    Skin:  Skin is warm and dry. No rash noted. No pallor.   Peripheral vascular:  No clubbing, no cyanosis, no edema.  Psychiatric: Appropriate mood and affect    Result Review    Result Review:  I have personally reviewed the results from the time of this admission to 3/5/2023 18:23 EST and agree with these findings:  [x]  Laboratory list / accordion  []  Microbiology  [x]  Radiology  [x]  EKG/Telemetry   []  Cardiology/Vascular   []  Pathology  []  Old records  []  Other:  Most notable findings include: Nitrate positive UTI      Assessment & Plan   Assessment / Plan     Brief Patient Summary:  Soraya Coker is a 89 y.o. female who presents to the emergency department with worsening weakness.  The patient's work-up has really only found a significant urinary tract infection.  Given the patient's age and history of complicated UTIs the ER doctor asked that we admit for further evaluation and treatment.  Active Hospital Problems:  1.  Complicated UTI  2.  Physical deconditioning  3.  Sick sinus syndrome status post pacemaker placement  4.  Poor appetite  5.  Diabetes mellitus  6.  Hypertension    Plan:   Patient will be admitted to the hospitalist service.  The patient was started on Rocephin IV in the emergency department which we will continue on the floor.  We will gently hydrate the patient but will have to be careful given the patient's history of CHF.  The main reason for hydration is that the patient says that she is nearly lost her appetite which she thinks is also contributing to her weakness overall.    DVT prophylaxis:  No DVT prophylaxis order currently exists.    CODE STATUS:    Level Of Support Discussed With: Patient  Code Status (Patient has no pulse and is not breathing): CPR (Attempt to Resuscitate)  Medical  "Interventions (Patient has pulse or is breathing): Full Support    Admission Status:  I believe this patient meets inpatient status.    Marshal Waller DO    Electronically signed by Marshal Waller DO at 23 1831          Physician Progress Notes (most recent note)      Joie Martini MD at 23 1407          [facility] Progress Note      Patient Name:  Soraya Coker   MRN:  [id number]   :  10/27/1930   Date of Admission:  3/5/2023   Date of Service:  3/6/2023   PMD:  Thomas Augustin MD     Hospital Course:    89-year-old female who was sent from the nursing home because of the patient says that she was placed in the nursing home after a hospitalization in [place].  She was supposed to be there for 3 weeks for rehabilitation but has been there for 6 weeks now.  Worsening weakness.  Patient has a history of CAD, CHF, diabetes mellitus, hypertension, and sick sinus syndrome status post pacemaker placement.  Patient says over the last week she has been increasingly weak and says that she has been \"just feeling awful\".  Here in the emergency department the patient was found to have a significant urinary tract infection.  The patient says that she has been battling UTIs for several years and that usually it takes \"medicine in the hospital\" in order to clear them up.  The ER doctor asked that we admit the patient at least overnight for further evaluation and more aggressive antibiotic treatment.       Consultants:        Procedures:  Chest x-ray    Anti-infectives:    Rocephin    2023    Chief Complaint:  Follow-up of patient with complicated UTI with generalized weakness and fatigue    Subjective:   Improved weakness and fatigue, started on IV antibiotics follow-up cultures.  Urine culture positive for gram-negative    Review Of Systems:  GENERAL:  No weakness , no fatigue, no time is denies any weight loss appetite is normal.  HEENT:  Denies any rhinitis, no " sore throat, no diplopia , hearing is normal  Respiratory:  Denies any shortness of breath , sweating d, yspnea on exertion , cough or sputum.  CVS:  Denies any chest pain , palpitation or syncope.  Gi:  No nausea, no vomiting, no diarrhea, no hematemesis , no melena , no rectal bleeding  :  Complains of dysuria frequency , no hematuria, no retention:  Musculoskeletal:  Denies any arthritis, or calf pain    Hematological:  No bleeding , no petechiae.  Skin:  Denies rash , pruritus , jaundice  Endocrine:  No polyuria , polydipsia , polyphagia.  CNS:  Denies any confusion , headache , or seizure disorder  Psychiatry:  No anxiety , or depression, no suicide ideation      Objective:  Vitals:    03/06/23 0430 03/06/23 0715 03/06/23 1141 03/06/23 1158   BP: 118/50 108/60 110/62    BP Location: Left arm Left arm Left arm    Patient Position: Lying Lying Lying    Pulse: 78      Resp: 18 18 20    Temp: 97.7 °F (36.5 °C) 97.9 °F (36.6 °C) 99 °F (37.2 °C)    TempSrc: Oral Oral Oral    SpO2: 95% 94% 99% 98%   Weight:       Height:              Physical Exam:  GENERAL APPEARANCE: Alert and oriented.  Appears comfortable.  No acute distress  HEENT: Atraumatic, normocephalic,  PERRLA, mucous membranes moist  NECK: supple; no JVD or thyromegaly noted  CARDIOVASCULAR: Regular rate and rhythm, no murmurs appreciated; no edema present in BLE   RESPIRATORY: good air entry bilaterally.  No wheezes, rhonchi, or rales appreciated  GASTROINTESTINAL:  Abdomen  soft; bowel sounds present, non-tender, non-distended, no organomegaly, no CVA tenderness   EXTREMITIES: Pulses equal bilaterally   MUSCULOSKELETAL:  Good muscle strength noted no obvious deformity appreciat  PSYCH: Appropriate mood and affect  Neurologic:  Alert & oriented x 3.  Normal Mental status.  Normal Cranial Nervies.  EOMI.  DIMAS.  Normal 5/5 muscular strength in both upper and lower extremities.  Normal sensation.  Normal and symmetric reflexes. Normal cerbellar function.   Normal Gait. Negative Babinski.    Labs Reviewed  CBC:    Lab Results   Component Value Date    WBC 10.06 03/06/2023    HGB 12.9 03/06/2023    HCT 39.7 03/06/2023    MCV 83.8 03/06/2023     CMP:    Lab Results   Component Value Date     (L) 03/06/2023    CO2 28.1 03/06/2023    GLUCOSE 161 (H) 03/06/2023    BUN 13 03/06/2023    CREATININE 0.50 (L) 03/06/2023    ALBUMIN 3.5 03/05/2023    CALCIUM 8.7 03/06/2023    AST 17 03/05/2023    ALT 11 03/05/2023     Lipis Panel: No results found for: CHOL, HDL   INR:    Lab Results   Component Value Date    INR 1.0 11/03/2020     Cardiac:  No results found for: CKMB, TROPONIN  No results found for: BNP  Lactate:    Lab Results   Component Value Date    LACTATE 1.2 03/05/2023    LACTATE 1.0 09/09/2022     Blood Culture:  @lastlabx(cult:2)@    Imaging:  XR Chest 1 View    Result Date: 3/5/2023  Narrative: PROCEDURE: XR CHEST 1 VW  COMPARISON: [facility], , CHEST PA/AP & LAT 2V, 2/21/2017, 11:16.  INDICATIONS: Weak/Dizzy/AMS triage protocol  FINDINGS:  In the interval left subclavian transvenous pacemaker generator pack has been exchanged.  The heart size is at the upper limits of normal.  Pulmonary vessels are normal.  Calcified granuloma seen within the right lung base.  Lungs are otherwise clear.  No pleural effusion or pneumothorax.  There are surgical clips in left axilla.      Impression:   1. 1. Interval exchange of left subclavian transvenous pacemaker pack.  2. No acute cardiopulmonary disease.  No evidence of pneumothorax.       ZOHRA MORILLO MD       Electronically Signed and Approved By: ZOHRA MORILLO MD on 3/05/2023 at 13:52                Medications Reviewed:  apixaban, 2.5 mg, Oral, BID  atorvastatin, 40 mg, Oral, Nightly  cefTRIAXone, 1 g, Intravenous, Q24H  cetirizine, 10 mg, Oral, Daily  docusate sodium, 100 mg, Oral, BID  furosemide, 40 mg, Oral, BID  levothyroxine, 150 mcg, Oral, QAM  miconazole, 1 application, Topical,  Q24H  polyethylene glycol, 17 g, Oral, Daily  potassium chloride, 20 mEq, Oral, Daily  Tiny-Bid Probiotic, 1 tablet, Oral, Daily  sodium chloride, 10 mL, Intravenous, Q12H         Assessment/Plan:    Patient Active Problem List    Diagnosis    • *UTI (urinary tract infection) [N39.0]       Medical decision making:    Complicated UTI:  Follow urine culture shows.    \Continue IV Rocephin for gram-negative.    Atrial fibrillation with sick sinus syndrome status post pacemaker:  Continue to monitor  Continue home medication    Type 2 diabetes mellitus:  Blood sugar ACHS  Sliding-scale.  Continue home medication    Hypertension:  Monitor blood pressure closely  Continue home antihypertensive    DVT Prophylaxis:    Lovenox  CODE STATUS:    Reason for continued hospitalization  and medical necessity   patient with complicated UTI and recent fatigue requiring further management    Orders:  CBC  CMP  Eliquis  Rocephin  Urine culture          Time spent:  39+ minute not only  including face-to-face rounding putting in the orders writing the note and all the conversation reviewing the records    This transcription was electronically signed.         Disposition:  Nursing home    Diet: Cardiac      Discussed with: Patient in detail      This has been electronically signed by:  _______________________________    Joie Deal [email or web address]     At [facility], we believe that sharing information builds trust and better relationships. You are receiving this note because you recently visited [facility]. It is possible you will see health information before a provider has talked with you about it. This kind of information can be easy to misunderstand. To help you fully understand what it means for your health, we urge you to discuss this note with your provider.           Part of this note may be an electronic transcription/translation of spoken language to printed ,text using the Dragon Dictation System.      Electronically signed by Joie Martini MD at 23 1411          Physical Therapy Notes (most recent note)      Adalberto Tyson, PT at 23 1425  Version 1 of 1         Acute Care - Physical Therapy Initial Evaluation   [facility]     Patient Name: Soraya Coker  : 10/27/1930  MRN: [id number]  Today's Date: 3/6/2023      Visit Dx:     ICD-10-CM ICD-9-CM   1. Acute urinary tract infection  N39.0 599.0   2. Weakness  R53.1 780.79   3. Difficulty in walking  R26.2 719.7     Patient Active Problem List   Diagnosis   • UTI (urinary tract infection)     Past Medical History:   Diagnosis Date   • Arthritis    • CAD (coronary artery disease)    • CHF (congestive heart failure) ([facility])    • Diabetes mellitus ([facility])    • GERD (gastroesophageal reflux disease)    • Hyperlipidemia    • Hypertension    • Pneumonia    • Sick sinus syndrome ([facility])      Past Surgical History:   Procedure Laterality Date   • CARDIAC DEFIBRILLATOR PLACEMENT     • DACRYOCYSTORHINOSTOMY Right 10/13/2022    Procedure: RIGHT DACRYOCYSTORHINOSTOMY WITH CURTIS TUBE;  Surgeon: Ritchie Cardenas MD;  Location: [facility];  Service: Ophthalmology;  Laterality: Right;   • MASTECTOMY Left    • REPLACEMENT TOTAL KNEE Bilateral      PT Assessment (last 12 hours)     PT Evaluation and Treatment     Row Name 23 1418          Physical Therapy Time and Intention    Subjective Information complains of;weakness  -BRENT     Document Type evaluation  -BRENT     Mode of Treatment individual therapy;physical therapy  -BRENT     Patient Effort good  -BRENT     Row Name 23 1418          General Information    Patient Profile Reviewed yes  -BRENT     Patient Observations alert;cooperative;agree to therapy  -BRENT     Prior Level of Function mod assist:  faciltiy resident  -BRENT     Equipment Currently Used at Home cane, straight;rollator;wheelchair, motorized  -BRENT     Existing Precautions/Restrictions fall  -BRENT     Barriers to Rehab none identified  -BRENT     Row Name  03/06/23 1418          Living Environment    Current Living Arrangements other (see comments)  Pt. was at [facility] rehab prior to hospital admission  -BRENT     Primary Care Provided by self;other (see comments)  facility staff  -BRENT     Row Name 03/06/23 1418          Home Use of Assistive/Adaptive Equipment    Equipment Currently Used at Home walker, rolling;wheelchair, motorized  -BRENT     Row Name 03/06/23 1418          Range of Motion (ROM)    Range of Motion ROM is WFL;bilateral lower extremities  -BRENT     Row Name 03/06/23 1418          Strength (Manual Muscle Testing)    Strength (Manual Muscle Testing) bilateral lower extremities  3/5 all planes  -BRENT     Row Name 03/06/23 1418          Bed Mobility    Bed Mobility supine-sit-supine  -BRENT     Supine-Sit-Supine Swain (Bed Mobility) minimum assist (75% patient effort);1 person assist  -BRENT     Bed Mobility, Safety Issues decreased use of arms for pushing/pulling;decreased use of legs for bridging/pushing  -BRENT     Assistive Device (Bed Mobility) bed rails;head of bed elevated;draw sheet  -BRENT     Row Name 03/06/23 1418          Safety Issues, Functional Mobility    Impairments Affecting Function (Mobility) balance;endurance/activity tolerance;strength  -BRENT     Row Name 03/06/23 1418          Balance    Balance Assessment sitting dynamic balance  -BRENT     Dynamic Sitting Balance contact guard  -BRENT     Position, Sitting Balance sitting edge of bed  -BRENT     Row Name 03/06/23 1418          Plan of Care Review    Plan of Care Reviewed With patient  -BRENT     Outcome Evaluation Pt. presents with significant bilateral LE weakness that impairs her ability to perform functional mobility safely and independently. Recommend subacute rehab upon hospital discharge to improve strength deficits and optimize functional independence.  -BRENT     Row Name 03/06/23 1418          Positioning and Restraints    Pre-Treatment Position in bed  -BRENT     Post Treatment Position bed  -BRENT     In Bed  supine;call light within reach;encouraged to call for assist  -BRENT     Row Name 03/06/23 1418          Therapy Assessment/Plan (PT)    Rehab Potential (PT) good, to achieve stated therapy goals  -BRENT     Criteria for Skilled Interventions Met (PT) yes  -BRENT     Therapy Frequency (PT) daily  -BRENT     Predicted Duration of Therapy Intervention (PT) 10 days  -BRENT     Problem List (PT) problems related to;balance;mobility;strength  -BRENT     Activity Limitations Related to Problem List (PT) unable to ambulate safely;unable to transfer safely  -BRENT     Row Name 03/06/23 1418          Therapy Plan Review/Discharge Plan (PT)    Therapy Plan Review (PT) evaluation/treatment results reviewed;patient  -BRENT     Row Name 03/06/23 1418          Physical Therapy Goals    Bed Mobility Goal Selection (PT) bed mobility, PT goal 1  -BRENT     Transfer Goal Selection (PT) transfer, PT goal 1  -BRENT     Gait Training Goal Selection (PT) gait training, PT goal 1  -BRENT     Strength Goal Selection (PT) strength, PT goal 1  -BRENT     Row Name 03/06/23 1418          Bed Mobility Goal 1 (PT)    Activity/Assistive Device (Bed Mobility Goal 1, PT) bed mobility activities, all  -BRENT     [place] Level/Cues Needed (Bed Mobility Goal 1, PT) standby assist  -BRENT     Time Frame (Bed Mobility Goal 1, PT) long term goal (LTG);10 days  -BRENT     Row Name 03/06/23 1418          Transfer Goal 1 (PT)    Activity/Assistive Device (Transfer Goal 1, PT) sit-to-stand/stand-to-sit;walker, rolling  -BRENT     [place] Level/Cues Needed (Transfer Goal 1, PT) minimum assist (75% or more patient effort)  -BRENT     Time Frame (Transfer Goal 1, PT) long term goal (LTG);10 days  -BRENT     Row Name 03/06/23 1418          Gait Training Goal 1 (PT)    Activity/Assistive Device (Gait Training Goal 1, PT) gait (walking locomotion);walker, rolling  -BRENT     [place] Level (Gait Training Goal 1, PT) minimum assist (75% or more patient effort)  -BRENT     Distance (Gait Training Goal 1, PT) 15   -BRENT     Time Frame (Gait Training Goal 1, PT) long term goal (LTG);10 days  -BRENT     Row Name 03/06/23 1418          Strength Goal 1 (PT)    Strength Goal 1 (PT) Pt. will improve bilateral knee extensor strength to 4/5.  -BRENT     Time Frame (Strength Goal 1, PT) long term goal (LTG);10 days  -BRENT           User Key  (r) = Recorded By, (t) = Taken By, (c) = Cosigned By    Initials Name Provider Type    Adalberto Elizalde, PT Physical Therapist                Physical Therapy Education     Title: PT OT SLP Therapies (Done)     Topic: Physical Therapy (Done)     Point: Mobility training (Done)     Learning Progress Summary           Patient Acceptance, E, VU by BRENT at 3/6/2023 1425                               User Key     Initials Effective Dates Name Provider Type Discipline    BRENT 06/03/21 -  Adalberto Tyson PT Physical Therapist PT              PT Recommendation and Plan  Anticipated Discharge Disposition (PT): sub acute care setting  Planned Therapy Interventions (PT): balance training, bed mobility training, gait training, patient/family education, strengthening, transfer training  Therapy Frequency (PT): daily  Plan of Care Reviewed With: patient  Outcome Evaluation: Pt. presents with significant bilateral LE weakness that impairs her ability to perform functional mobility safely and independently. Recommend subacute rehab upon hospital discharge to improve strength deficits and optimize functional independence.   Outcome Measures     Row Name 03/06/23 1424             How much help from another person do you currently need...    Turning from your back to your side while in flat bed without using bedrails? 3  -BRENT      Moving from lying on back to sitting on the side of a flat bed without bedrails? 3  -BRENT      Moving to and from a bed to a chair (including a wheelchair)? 1  -BRENT      Standing up from a chair using your arms (e.g., wheelchair, bedside chair)? 1  -BRENT      Climbing 3-5 steps with a railing? 1  -BRENT       To walk in hospital room? 1  -BRENT      AM-PAC 6 Clicks Score (PT) 10  -BRENT         Functional Assessment    Outcome Measure Options AM-PAC 6 Clicks Basic Mobility (PT)  -BRENT            User Key  (r) = Recorded By, (t) = Taken By, (c) = Cosigned By    Initials Name Provider Type    Adalberto Elizalde, PT Physical Therapist                 Time Calculation:    PT Charges     Row Name 03/06/23 1417             Time Calculation    PT Received On 03/06/23  -BRENT      PT Goal Re-Cert Due Date 03/15/23  -BRENT         Untimed Charges    PT Eval/Re-eval Minutes 25  -BRENT         Total Minutes    Untimed Charges Total Minutes 25  -BRENT       Total Minutes 25  -BRENT            User Key  (r) = Recorded By, (t) = Taken By, (c) = Cosigned By    Initials Name Provider Type    Adalberto Elizalde, PT Physical Therapist              Therapy Charges for Today     Code Description Service Date Service Provider Modifiers Qty    45470996215 HC PT EVAL LOW COMPLEXITY 2 3/6/2023 Adalberto Tyson, PT GP 1          PT G-Codes  Outcome Measure Options: AM-PAC 6 Clicks Basic Mobility (PT)  AM-PAC 6 Clicks Score (PT): 10    Adalberto Tyson PT  3/6/2023      Electronically signed by Adalberto Tyson PT at 03/06/23 1424

## 2023-03-07 PROBLEM — N39.0 ACUTE URINARY TRACT INFECTION: Status: ACTIVE | Noted: 2023-03-07

## 2023-03-07 PROBLEM — E43 SEVERE MALNUTRITION: Status: ACTIVE | Noted: 2023-03-07

## 2023-03-07 LAB
ANION GAP SERPL CALCULATED.3IONS-SCNC: 8.3 MMOL/L (ref 5–15)
BASOPHILS # BLD AUTO: 0.08 10*3/MM3 (ref 0–0.2)
BASOPHILS NFR BLD AUTO: 0.9 % (ref 0–1.5)
BUN SERPL-MCNC: 12 MG/DL (ref 8–23)
BUN/CREAT SERPL: 25.5 (ref 7–25)
CALCIUM SPEC-SCNC: 8.7 MG/DL (ref 8.2–9.6)
CHLORIDE SERPL-SCNC: 94 MMOL/L (ref 98–107)
CO2 SERPL-SCNC: 29.7 MMOL/L (ref 22–29)
CREAT SERPL-MCNC: 0.47 MG/DL (ref 0.57–1)
DEPRECATED RDW RBC AUTO: 41.6 FL (ref 37–54)
EGFRCR SERPLBLD CKD-EPI 2021: 89.4 ML/MIN/1.73
EOSINOPHIL # BLD AUTO: 1.03 10*3/MM3 (ref 0–0.4)
EOSINOPHIL NFR BLD AUTO: 11.7 % (ref 0.3–6.2)
ERYTHROCYTE [DISTWIDTH] IN BLOOD BY AUTOMATED COUNT: 13.8 % (ref 12.3–15.4)
GLUCOSE SERPL-MCNC: 115 MG/DL (ref 65–99)
HCT VFR BLD AUTO: 39.1 % (ref 34–46.6)
HGB BLD-MCNC: 12.9 G/DL (ref 12–15.9)
IMM GRANULOCYTES # BLD AUTO: 0.04 10*3/MM3 (ref 0–0.05)
IMM GRANULOCYTES NFR BLD AUTO: 0.5 % (ref 0–0.5)
LYMPHOCYTES # BLD AUTO: 2.17 10*3/MM3 (ref 0.7–3.1)
LYMPHOCYTES NFR BLD AUTO: 24.7 % (ref 19.6–45.3)
MCH RBC QN AUTO: 27.2 PG (ref 26.6–33)
MCHC RBC AUTO-ENTMCNC: 33 G/DL (ref 31.5–35.7)
MCV RBC AUTO: 82.5 FL (ref 79–97)
MONOCYTES # BLD AUTO: 1.09 10*3/MM3 (ref 0.1–0.9)
MONOCYTES NFR BLD AUTO: 12.4 % (ref 5–12)
NEUTROPHILS NFR BLD AUTO: 4.38 10*3/MM3 (ref 1.7–7)
NEUTROPHILS NFR BLD AUTO: 49.8 % (ref 42.7–76)
NRBC BLD AUTO-RTO: 0 /100 WBC (ref 0–0.2)
PLATELET # BLD AUTO: 336 10*3/MM3 (ref 140–450)
PMV BLD AUTO: 8.6 FL (ref 6–12)
POTASSIUM SERPL-SCNC: 4.1 MMOL/L (ref 3.5–5.2)
RBC # BLD AUTO: 4.74 10*6/MM3 (ref 3.77–5.28)
SODIUM SERPL-SCNC: 132 MMOL/L (ref 136–145)
WBC NRBC COR # BLD: 8.79 10*3/MM3 (ref 3.4–10.8)

## 2023-03-07 PROCEDURE — 97110 THERAPEUTIC EXERCISES: CPT

## 2023-03-07 PROCEDURE — 80048 BASIC METABOLIC PNL TOTAL CA: CPT | Performed by: INTERNAL MEDICINE

## 2023-03-07 PROCEDURE — 25010000002 CEFTRIAXONE PER 250 MG: Performed by: FAMILY MEDICINE

## 2023-03-07 PROCEDURE — 94799 UNLISTED PULMONARY SVC/PX: CPT

## 2023-03-07 PROCEDURE — 99232 SBSQ HOSP IP/OBS MODERATE 35: CPT | Performed by: INTERNAL MEDICINE

## 2023-03-07 PROCEDURE — 97530 THERAPEUTIC ACTIVITIES: CPT

## 2023-03-07 PROCEDURE — 85025 COMPLETE CBC W/AUTO DIFF WBC: CPT | Performed by: INTERNAL MEDICINE

## 2023-03-07 RX ORDER — SAL ACID/UREA/PETROLATUM,WHITE 5 %-10 %
1 OINTMENT (GRAM) TOPICAL
Status: DISCONTINUED | OUTPATIENT
Start: 2023-03-07 | End: 2023-03-10 | Stop reason: HOSPADM

## 2023-03-07 RX ADMIN — APIXABAN 2.5 MG: 2.5 TABLET, FILM COATED ORAL at 08:04

## 2023-03-07 RX ADMIN — FUROSEMIDE 40 MG: 40 TABLET ORAL at 16:34

## 2023-03-07 RX ADMIN — Medication 10 ML: at 08:04

## 2023-03-07 RX ADMIN — PROBIOTIC PRODUCT - TAB 1 TABLET: TAB at 08:04

## 2023-03-07 RX ADMIN — CETIRIZINE HYDROCHLORIDE 10 MG: 10 TABLET, FILM COATED ORAL at 08:04

## 2023-03-07 RX ADMIN — HYDROCORTISONE 1 APPLICATION: 1 OINTMENT TOPICAL at 12:11

## 2023-03-07 RX ADMIN — POLYETHYLENE GLYCOL 3350 17 G: 17 POWDER, FOR SOLUTION ORAL at 08:04

## 2023-03-07 RX ADMIN — HYDROCORTISONE 1 APPLICATION: 1 OINTMENT TOPICAL at 22:34

## 2023-03-07 RX ADMIN — Medication 1 EACH: at 21:41

## 2023-03-07 RX ADMIN — DOCUSATE SODIUM 100 MG: 100 CAPSULE, LIQUID FILLED ORAL at 08:04

## 2023-03-07 RX ADMIN — POTASSIUM CHLORIDE 20 MEQ: 1.5 POWDER, FOR SOLUTION ORAL at 08:04

## 2023-03-07 RX ADMIN — LEVOTHYROXINE SODIUM 150 MCG: 0.15 TABLET ORAL at 06:09

## 2023-03-07 RX ADMIN — ATORVASTATIN CALCIUM 40 MG: 40 TABLET, FILM COATED ORAL at 22:34

## 2023-03-07 RX ADMIN — DOCUSATE SODIUM 100 MG: 100 CAPSULE, LIQUID FILLED ORAL at 22:34

## 2023-03-07 RX ADMIN — HYDROCORTISONE 1 APPLICATION: 1 OINTMENT TOPICAL at 16:31

## 2023-03-07 RX ADMIN — CEFTRIAXONE SODIUM 1 G: 1 INJECTION, SOLUTION INTRAVENOUS at 16:30

## 2023-03-07 RX ADMIN — FUROSEMIDE 40 MG: 40 TABLET ORAL at 08:04

## 2023-03-07 RX ADMIN — APIXABAN 2.5 MG: 2.5 TABLET, FILM COATED ORAL at 22:34

## 2023-03-07 NOTE — CONSULTS
Nutrition Services    Patient Name: Soraya Coker  YOB: 1930  MRN: [id number]  Admission date: 3/5/2023      CLINICAL NUTRITION ASSESSMENT      Reason for Assessment  Identified at risk by screening criteria, MST score 2+, Reduced oral intake   H&P:    Past Medical History:   Diagnosis Date   • Arthritis    • CAD (coronary artery disease)    • CHF (congestive heart failure) (Formerly [facility])    • Diabetes mellitus ([facility])    • GERD (gastroesophageal reflux disease)    • Hyperlipidemia    • Hypertension    • Pneumonia    • Sick sinus syndrome ([facility])         Current Problems:   Active Hospital Problems    Diagnosis    • **UTI (urinary tract infection)         Nutrition/Diet History         Narrative     RD consult for MST score of 3, unsure of wt loss.  Pt also reports decreased appetite resulting increased weakness per H&P.  Pt normally resides at a nursing home but currently admitted for a UTI.  Per chart review pt has lost 10# since October, a 5.5% change, not consider clinically significant at this time.  RD made to attempts to visit pt this morning but at one time pt sleeping & the other physician was in with patient.  Will go ahead & start to on oral nutrition supplement of Boost Glucose Control to add extra calories/protein & monitoring for tolerance.     3/7 Able to visit with pt this morning.  Pt unsure of how long wound on bottom as been there, but notes getting good care here.  Pt c/o decreased appetite for past few months, will normally eat a good breakfast but limited the rest of the day.  RD completed a NFPE which shows areas of muscle & fat wasting.  Pt also c/o decreased strength since before Christmas.  Pt meets ASPEN criteria for malnutrition.  Pt liking the Boost Glucose Control previously ordered yesterday, stating she drank Glucerna previously at home.  Pt agreeable to try Ke for arginine to help with wound healing.     Anthropometrics        Current Height, Weight Height: 157.5 cm  "(62.01\")  Weight: 78.1 kg (172 lb 2.9 oz)   Current BMI Body mass index is 31.48 kg/m².       Weight Hx  Wt Readings from Last 30 Encounters:   03/05/23 2027 78.1 kg (172 lb 2.9 oz)   03/05/23 1915 78.1 kg (172 lb 2.9 oz)   03/05/23 1311 79.3 kg (174 lb 13.2 oz)   10/13/22 0732 82.6 kg (182 lb)   09/09/22 1201 88.8 kg (195 lb 12.3 oz)            Wt Change Observation 10# since October, ~5.5%     Estimated/Assessed Needs       Energy Requirements    EST Needs (kcal/day) 4499-7920 (MSJ *1.4-1.5)       Protein Requirements    EST Daily Needs (g/day) 78 (1 gm)       Fluid Requirements     Estimated Needs (mL/day) 1602     Labs/Medications         Pertinent Labs Reviewed.   Results from last 7 days   Lab Units 03/07/23 0552 03/06/23 1114 03/06/23 0603 03/05/23  1451   SODIUM mmol/L 132* 131* 134* 131*   POTASSIUM mmol/L 4.1 4.0 3.9 4.0   CHLORIDE mmol/L 94* 95* 97* 92*   CO2 mmol/L 29.7* 28.1 27.6 29.1*   BUN mg/dL 12 13 13 13   CREATININE mg/dL 0.47* 0.50* 0.47* 0.54*   CALCIUM mg/dL 8.7 8.7 8.6 8.9   BILIRUBIN mg/dL  --   --   --  0.6   ALK PHOS U/L  --   --   --  125*   ALT (SGPT) U/L  --   --   --  11   AST (SGOT) U/L  --   --   --  17   GLUCOSE mg/dL 115* 161* 129* 133*     Results from last 7 days   Lab Units 03/07/23 0552 03/06/23 1114 03/06/23 0603 03/05/23  1451   MAGNESIUM mg/dL  --   --  1.8 1.7   HEMOGLOBIN g/dL 12.9   < > 12.3 13.2   HEMATOCRIT % 39.1   < > 38.0 40.7    < > = values in this interval not displayed.       Pertinent Medications Reviewed.     Current Nutrition Orders & Evaluation of Intake       Oral Nutrition     Current PO Diet Diet: Diabetic Diets; Consistent Carbohydrate; Texture: Regular Texture (IDDSI 7); Fluid Consistency: Thin (IDDSI 0)   Supplement Orders Placed This Encounter      Dietary Nutrition Supplements Boost Glucose Control (Glucerna Shake); vanilla       Malnutrition Severity Assessment      Patient meets criteria for : Severe Malnutrition  Malnutrition Type (last 8 " hours)     Malnutrition Severity Assessment     Row Name 03/07/23 1128       Malnutrition Severity Assessment    Malnutrition Type Chronic Disease - Related Malnutrition    Row Name 03/07/23 1128       Insufficient Energy Intake     Insufficient Energy Intake Findings Severe    Insufficient Energy Intake  <75% of est. energy requirement for > or equal to 1 month    Row Name 03/07/23 1128       Muscle Loss    Loss of Muscle Mass Findings Severe    Yazdanism Region Severe - deep hollowing/scooping, lack of muscle to touch, facial bones well defined    Clavicle Bone Region Moderate - some protrusion in females, visible in males    Acromion Bone Region Severe - squared shoulders, bones, and acromion process protrusion prominent    Patellar Region Severe - prominent bone, square looking, very little muscle definition    Row Name 03/07/23 1128       Fat Loss    Orbital Region  Severe - pronounced hollowness/depression, dark circles, loose saggy skin    Upper Arm Region Severe - mostly skin, very little space between folds, fingers touch    Row Name 03/07/23 1128       Criteria Met (Must meet criteria for severity in at least 2 of these categories: M Wasting, Fat Loss, Fluid, Secondary Signs, Wt. Status, Intake)    Patient meets criteria for  Severe Malnutrition                 Nutrition Diagnosis         Nutrition Dx Problem 1 Severe malnutrition related to inadequate oral Intake as evidenced by decreased appetite., unintended wt change., body composition changes., patient report. and bilateral PI to gluteal     Nutrition Intervention         Continue carb consistent, heart healthy diet  Continue Boost Glucose Control TID (570 kcal & 28 gm protein)  Add Ke BID for arginine     Medical Nutrition Therapy/Nutrition Education          Learner     Readiness Patient  Acceptance     Method     Response Explanation  Verbalizes understanding     Monitor/Evaluation        Monitor PO intake, Supplement intake, Pertinent labs,  Weight, Skin status     Nutrition Discharge Plan         To be determined     Electronically signed by:  Tiesha Milner RD  03/07/23 10:56 EST

## 2023-03-07 NOTE — SIGNIFICANT NOTE
Wound Eval / Progress Noted     Iman     Patient Name: Soraya Coker  : 10/27/1930  MRN: [id number]  Today's Date: 3/7/2023                 Admit Date: 3/5/2023    Visit Dx:    ICD-10-CM ICD-9-CM   1. Acute urinary tract infection  N39.0 599.0   2. Weakness  R53.1 780.79   3. Difficulty in walking  R26.2 719.7       Patient Active Problem List   Diagnosis   • UTI (urinary tract infection)        Past Medical History:   Diagnosis Date   • Arthritis    • CAD (coronary artery disease)    • CHF (congestive heart failure) (Formerly [facility])    • Diabetes mellitus ([facility])    • GERD (gastroesophageal reflux disease)    • Hyperlipidemia    • Hypertension    • Pneumonia    • Sick sinus syndrome (Former[facility])         Past Surgical History:   Procedure Laterality Date   • CARDIAC DEFIBRILLATOR PLACEMENT     • DACRYOCYSTORHINOSTOMY Right 10/13/2022    Procedure: RIGHT DACRYOCYSTORHINOSTOMY WITH CURTIS TUBE;  Surgeon: Ritchie Cardenas MD;  Location: [facility];  Service: Ophthalmology;  Laterality: Right;   • MASTECTOMY Left    • REPLACEMENT TOTAL KNEE Bilateral          Physical Assessment:  Wound 23 1530 Bilateral gluteal (Active)   Wound Image   23 1628   Dressing Appearance open to air 23 09   Closure None 23 0905   Base red;pink;moist;blanchable 23 0905   Red (%), Wound Tissue Color 100 23 0905   Periwound blanchable;redness;moist 23 0905   Periwound Temperature warm 23 0905   Periwound Skin Turgor soft 23 0905   Edges open 23 0905   Drainage Characteristics/Odor serosanguineous 23 09   Drainage Amount small 23 0905   Care, Wound cleansed with;sterile normal saline 23 0905   Dressing Care open to air;skin barrier agent applied 23 0905   Periwound Care barrier ointment applied;topical treatment applied 23 0905        Wound Check / Follow-up:  Patient seen today for a wound consult. Buttocks with severe erosion noted from moisture.  Tissue is red and moist with some pale white tissue. All tissue remains blanchable at this time. Cleansed with NS. Applied magic barrier cream to buttocks and left open to air. Recommend BID  Domeboro soaks to buttocks; after soak is complete pat buttocks dry and then apply magic barrier. Keep patient clean and free from moisture at all times. Implement q2h turns and offload heels.    Impression: Erosion noted to buttocks from moisture    Short term goals:  Regain skin integrity, pressure reduction, skin protection, topical treatment, domeboro soaks    Richa Lozano RN    3/7/2023    13:06 EST

## 2023-03-07 NOTE — PROGRESS NOTES
"[facility] Progress Note      Patient Name:  Soraya Coker   MRN:  [id number]   :  10/27/1930   Date of Admission:  3/5/2023   Date of Service:  3/7/2023   PMD:  Thomas Augustin MD     Hospital Course:    89-year-old female who was sent from the nursing home because of the patient says that she was placed in the nursing home after a hospitalization in [place].  She was supposed to be there for 3 weeks for rehabilitation but has been there for 6 weeks now.  Worsening weakness.  Patient has a history of CAD, CHF, diabetes mellitus, hypertension, and sick sinus syndrome status post pacemaker placement.  Patient says over the last week she has been increasingly weak and says that she has been \"just feeling awful\".  Here in the emergency department the patient was found to have a significant urinary tract infection.  The patient says that she has been battling UTIs for several years and that usually it takes \"medicine in the hospital\" in order to clear them up.  The ER doctor asked that we admit the patient at least overnight for further evaluation and more aggressive antibiotic treatment.       Consultants:        Procedures:  Chest x-ray    Anti-infectives:    Rocephin    2023    Chief Complaint:  Follow-up of patient with complicated UTI with generalized weakness and fatigue    Subjective:   Improved weakness and fatigue, started on IV antibiotics follow-up cultures.  Urine culture positive for e. Coli  Patient ready to discharge and feels fine     Review Of Systems:  GENERAL:  No weakness , no fatigue, no time is denies any weight loss appetite is normal.  HEENT:  Denies any rhinitis, no sore throat, no diplopia , hearing is normal  Respiratory:  Denies any shortness of breath , sweating d, yspnea on exertion , cough or sputum.  CVS:  Denies any chest pain , palpitation or syncope.  Gi:  No nausea, no vomiting, no diarrhea, no hematemesis , no melena , no " rectal bleeding  :  no dysuria   Musculoskeletal:  Denies any arthritis, or calf pain    Hematological:  No bleeding , no petechiae.  Skin:  Denies rash , pruritus , jaundice  Endocrine:  No polyuria , polydipsia , polyphagia.  CNS:  Denies any confusion , headache , or seizure disorder  Psychiatry:  No anxiety , or depression, no suicide ideation      Objective:  Vitals:    03/07/23 0021 03/07/23 0407 03/07/23 0613 03/07/23 0741   BP: 116/56 108/60  100/50   BP Location: Right arm Right arm  Right arm   Patient Position: Lying Lying  Lying   Pulse: 76 77 76 74   Resp: 20 20  18   Temp: 97.9 °F (36.6 °C) 98.6 °F (37 °C)  98.4 °F (36.9 °C)   TempSrc: Oral Oral  Oral   SpO2: 94% 94% 92% 96%   Weight:       Height:              Physical Exam:  GENERAL APPEARANCE: Alert and oriented.  Appears comfortable.  No acute distress. Resting in bed eating lunch   HEENT: Atraumatic, normocephalic,  PERRLA, mucous membranes moist  NECK: supple; no JVD or thyromegaly noted  CARDIOVASCULAR: Regular rate and rhythm, no murmurs appreciated; no edema present in BLE   RESPIRATORY: good air entry bilaterally.  No wheezes, rhonchi, or rales appreciated  GASTROINTESTINAL:  Abdomen  soft; bowel sounds present, non-tender, non-distended, no organomegaly, no CVA tenderness   EXTREMITIES: Pulses equal bilaterally   MUSCULOSKELETAL:  Good muscle strength noted no obvious deformity appreciat  PSYCH: Appropriate mood and affect  Neurologic:  Alert & oriented x 3.  Normal Mental status.  Normal Cranial Nervies.  EOMI.  DIMAS.  Normal 5/5 muscular strength in both upper and lower extremities.     Labs Reviewed  CBC:    Lab Results   Component Value Date    WBC 8.79 03/07/2023    HGB 12.9 03/07/2023    HCT 39.1 03/07/2023    MCV 82.5 03/07/2023     CMP:    Lab Results   Component Value Date     (L) 03/07/2023    CO2 29.7 (H) 03/07/2023    GLUCOSE 115 (H) 03/07/2023    BUN 12 03/07/2023    CREATININE 0.47 (L) 03/07/2023    ALBUMIN 3.5  03/05/2023    CALCIUM 8.7 03/07/2023    AST 17 03/05/2023    ALT 11 03/05/2023     Lipis Panel: No results found for: CHOL, HDL   INR:    Lab Results   Component Value Date    INR 1.0 11/03/2020     Cardiac:  No results found for: CKMB, TROPONIN  No results found for: BNP  Lactate:    Lab Results   Component Value Date    LACTATE 1.2 03/05/2023    LACTATE 1.0 09/09/2022     Blood Culture:  @lastlabx(cult:2)@    Imaging:  XR Chest 1 View    Result Date: 3/5/2023  Narrative: PROCEDURE: XR CHEST 1 VW  COMPARISON: [facility], , CHEST PA/AP & LAT 2V, 2/21/2017, 11:16.  INDICATIONS: Weak/Dizzy/AMS triage protocol  FINDINGS:  In the interval left subclavian transvenous pacemaker generator pack has been exchanged.  The heart size is at the upper limits of normal.  Pulmonary vessels are normal.  Calcified granuloma seen within the right lung base.  Lungs are otherwise clear.  No pleural effusion or pneumothorax.  There are surgical clips in left axilla.      Impression:   1. 1. Interval exchange of left subclavian transvenous pacemaker pack.  2. No acute cardiopulmonary disease.  No evidence of pneumothorax.       ZOHRA MORILLO MD       Electronically Signed and Approved By: ZOHRA MORILLO MD on 3/05/2023 at 13:52                Medications Reviewed:  apixaban, 2.5 mg, Oral, BID  atorvastatin, 40 mg, Oral, Nightly  cefTRIAXone, 1 g, Intravenous, Q24H  cetirizine, 10 mg, Oral, Daily  docusate sodium, 100 mg, Oral, BID  furosemide, 40 mg, Oral, BID  hydrocortisone-bacitracin-zinc oxide-nystatin, 1 application, Topical, 4x Daily  levothyroxine, 150 mcg, Oral, QAM  miconazole, 1 application, Topical, Q24H  polyethylene glycol, 17 g, Oral, Daily  potassium chloride, 20 mEq, Oral, Daily  Tiny-Bid Probiotic, 1 tablet, Oral, Daily  sodium chloride, 10 mL, Intravenous, Q12H         Assessment/Plan:    Patient Active Problem List    Diagnosis    • *UTI (urinary tract infection) [N39.0]       Medical decision  making:    E. Coli UTI     Continue IV Rocephin     Atrial fibrillation with sick sinus syndrome status post pacemaker:  Continue to monitor  Continue home medication    Type 2 diabetes mellitus:  Blood sugar ACHS  Sliding-scale.  Continue home medication    Hypertension:  Monitor blood pressure closely  Continue home antihypertensive    DVT Prophylaxis:    Lovenox  CODE STATUS:    Reason for continued hospitalization  and medical necessity   patient with complicated UTI and recent fatigue requiring further management    Orders:  CBC  CMP  Eliquis  Rocephin  Urine culture              Disposition:  Nursing home    Diet: Cardiac

## 2023-03-07 NOTE — THERAPY TREATMENT NOTE
Acute Care - Physical Therapy Treatment Note   Valerio     Patient Name: Soraya Coker  : 10/27/1930  MRN: [id number]  Today's Date: 3/7/2023      Visit Dx:     ICD-10-CM ICD-9-CM   1. Acute urinary tract infection  N39.0 599.0   2. Weakness  R53.1 780.79   3. Difficulty in walking  R26.2 719.7     Patient Active Problem List   Diagnosis   • UTI (urinary tract infection)     Past Medical History:   Diagnosis Date   • Arthritis    • CAD (coronary artery disease)    • CHF (congestive heart failure) ([facility])    • Diabetes mellitus (HCC)    • GERD (gastroesophageal reflux disease)    • Hyperlipidemia    • Hypertension    • Pneumonia    • Sick sinus syndrome ([facility])      Past Surgical History:   Procedure Laterality Date   • CARDIAC DEFIBRILLATOR PLACEMENT     • DACRYOCYSTORHINOSTOMY Right 10/13/2022    Procedure: RIGHT DACRYOCYSTORHINOSTOMY WITH CURTIS TUBE;  Surgeon: Ritchie Cardenas MD;  Location: [facility];  Service: Ophthalmology;  Laterality: Right;   • MASTECTOMY Left    • REPLACEMENT TOTAL KNEE Bilateral      PT Assessment (last 12 hours)     PT Evaluation and Treatment     Row Name 23 0931          Physical Therapy Time and Intention    Subjective Information complains of;weakness;fatigue;pain  -DK     Document Type therapy note (daily note)  -DK     Mode of Treatment individual therapy;physical therapy  -DK     Patient Effort good  -DK     Symptoms Noted During/After Treatment fatigue;increased pain  -DK     Comment Pt reports not ambulating since 2022.  She reports legs give out.  Audible crepitus in the left knee. Pt reports falls in the past.  -DK     Row Name 23 6240          Pain    Pretreatment Pain Rating 1/10  -DK     Posttreatment Pain Rating 1/10  -DK     Pain Location generalized  -DK     Pain Location - back;hip;knee  -DK     Pain Intervention(s) Repositioned;Ambulation/increased activity;Distraction;Therapeutic presence  -DK     Row Name 23 4800           Cognition    Affect/Mental Status (Cognition) WFL  -DK     Orientation Status (Cognition) oriented x 4  -DK     Follows Commands (Cognition) WFL  -DK     Cognitive Function WFL  -DK     Personal Safety Interventions nonskid shoes/slippers when out of bed;supervised activity  -     Row Name 03/07/23 0940          Bed Mobility    Bed Mobility scooting/bridging;supine-sit-supine  -DK     Scooting/Bridging Hardesty (Bed Mobility) maximum assist (25% patient effort);2 person assist  -DK     Supine-Sit-Supine Hardesty (Bed Mobility) maximum assist (25% patient effort);1 person assist  -DK     Bed Mobility, Safety Issues decreased use of arms for pushing/pulling;decreased use of legs for bridging/pushing  -DK     Assistive Device (Bed Mobility) bed rails;draw sheet  -     Row Name 03/07/23 0940          Safety Issues, Functional Mobility    Safety Issues Affecting Function (Mobility) safety precaution awareness  -DK     Impairments Affecting Function (Mobility) balance;endurance/activity tolerance;pain;range of motion (ROM);strength  -     Row Name 03/07/23 0940          Balance    Balance Assessment sitting static balance;sitting dynamic balance  -DK     Static Sitting Balance standby assist  -     Dynamic Sitting Balance standby assist  -DK     Position, Sitting Balance unsupported;sitting edge of bed  -     Balance Interventions sitting;static;dynamic  -     Row Name 03/07/23 0940          Motor Skills    Motor Skills --  therapeutic exercises  -     Therapeutic Exercise hip;knee;ankle  -     Row Name 03/07/23 0940          Hip (Therapeutic Exercise)    Hip (Therapeutic Exercise) AAROM (active assistive range of motion)  -     Hip AAROM (Therapeutic Exercise) bilateral;flexion;extension;aBduction;aDduction;supine;10 repetitions;2 sets  -     Row Name 03/07/23 0940          Knee (Therapeutic Exercise)    Knee (Therapeutic Exercise) AAROM (active assistive range of motion)  -     Knee AAROM  (Therapeutic Exercise) bilateral;flexion;extension;supine;10 repetitions;2 sets  -DK     Row Name 03/07/23 0940          Ankle (Therapeutic Exercise)    Ankle (Therapeutic Exercise) AAROM (active assistive range of motion)  -DK     Ankle AAROM (Therapeutic Exercise) bilateral;dorsiflexion;plantarflexion;supine;10 repetitions;2 sets  -DK     Row Name             Wound 03/06/23 1530 Bilateral gluteal    Wound - Properties Group Placement Date: 03/06/23  - Placement Time: 1530  -AH Present on Hospital Admission: Y  -AH Side: Bilateral  -AH Location: gluteal  -AH    Retired Wound - Properties Group Placement Date: 03/06/23  - Placement Time: 1530  -AH Present on Hospital Admission: Y  -AH Side: Bilateral  -AH Location: gluteal  -AH    Retired Wound - Properties Group Date first assessed: 03/06/23  - Time first assessed: 1530  -AH Present on Hospital Admission: Y  -AH Side: Bilateral  -AH Location: gluteal  -AH    Row Name 03/07/23 0940          Plan of Care Review    Plan of Care Reviewed With patient  -DK     Progress no change  -DK     Row Name 03/07/23 0940          Positioning and Restraints    Pre-Treatment Position in bed  -DK     Post Treatment Position bed  -DK     In Bed supine;call light within reach;encouraged to call for assist;exit alarm on;side rails up x2;legs elevated;heels elevated  -DK     Row Name 03/07/23 0940          Therapy Assessment/Plan (PT)    Rehab Potential (PT) fair, will monitor progress closely  -DK     Criteria for Skilled Interventions Met (PT) skilled treatment is necessary  -DK     Therapy Frequency (PT) daily  -DK     Problem List (PT) problems related to;balance;mobility;range of motion (ROM);strength;pain;hearing  -DK     Activity Limitations Related to Problem List (PT) unable to ambulate safely;unable to transfer safely  -DK     Row Name 03/07/23 0940          Progress Summary (PT)    Progress Toward Functional Goals (PT) progress toward functional goals is fair  -DK            User Key  (r) = Recorded By, (t) = Taken By, (c) = Cosigned By    Initials Name Provider Type    Maria Alejandra Cruz PTA Physical Therapist Assistant    Latrice Le RN Registered Nurse                Physical Therapy Education     Title: PT OT SLP Therapies (Done)     Topic: Physical Therapy (Done)     Point: Mobility training (Done)     Learning Progress Summary           Patient Acceptance, E, VU by BRENT at 3/6/2023 1425                               User Key     Initials Effective Dates Name Provider Type Discipline    BRENT 06/03/21 -  Adalberto Tyson, PT Physical Therapist PT              PT Recommendation and Plan  Planned Therapy Interventions (PT): balance training, bed mobility training, gait training, strengthening, transfer training  Therapy Frequency (PT): daily  Progress Summary (PT)  Progress Toward Functional Goals (PT): progress toward functional goals is fair  Plan of Care Reviewed With: patient  Progress: no change   Outcome Measures     Row Name 03/07/23 0940 03/06/23 1424          How much help from another person do you currently need...    Turning from your back to your side while in flat bed without using bedrails? 3  -DK 3  -BRENT     Moving from lying on back to sitting on the side of a flat bed without bedrails? 2  -DK 3  -BRENT     Moving to and from a bed to a chair (including a wheelchair)? 1  -DK 1  -BRENT     Standing up from a chair using your arms (e.g., wheelchair, bedside chair)? 1  -DK 1  -BRENT     Climbing 3-5 steps with a railing? 1  -DK 1  -BRENT     To walk in hospital room? 1  -DK 1  -BRENT     AM-PAC 6 Clicks Score (PT) 9  -DK 10  -BRENT        Functional Assessment    Outcome Measure Options AM-PAC 6 Clicks Basic Mobility (PT)  -DK AM-PAC 6 Clicks Basic Mobility (PT)  -BRENT           User Key  (r) = Recorded By, (t) = Taken By, (c) = Cosigned By    Initials Name Provider Type    Maria Alejandra Cruz PTA Physical Therapist Assistant    Adalberto Elizalde, PT Physical Therapist                  Time Calculation:    PT Charges     Row Name 03/07/23 0945             Time Calculation    PT Received On 03/07/23  -DK      PT Goal Re-Cert Due Date 03/15/23  -DK         Timed Charges    03420 - PT Therapeutic Exercise Minutes 16  -DK      83110 - PT Therapeutic Activity Minutes 23  -DK         Total Minutes    Timed Charges Total Minutes 39  -DK       Total Minutes 39  -DK            User Key  (r) = Recorded By, (t) = Taken By, (c) = Cosigned By    Initials Name Provider Type    Maria Alejandra Cruz PTA Physical Therapist Assistant              Therapy Charges for Today     Code Description Service Date Service Provider Modifiers Qty    82227448778 HC PT THER PROC EA 15 MIN 3/7/2023 Maria Alejandra Pittman PTA GP 1    27848802615 HC PT THERAPEUTIC ACT EA 15 MIN 3/7/2023 Maria Alejandra Pittman PTA GP 2          PT G-Codes  Outcome Measure Options: AM-PAC 6 Clicks Basic Mobility (PT)  AM-PAC 6 Clicks Score (PT): 9    Maria Alejandra Pittman PTA  3/7/2023

## 2023-03-07 NOTE — PLAN OF CARE
Goal Outcome Evaluation:      Pt is A&O x4, forgetful. Stable vitals. Awaiting consult from  wound care nurses for skin breakdown at the buttocks. No complaints of pain. Continue care plan.

## 2023-03-08 LAB
ANION GAP SERPL CALCULATED.3IONS-SCNC: 5.9 MMOL/L (ref 5–15)
BACTERIA SPEC AEROBE CULT: ABNORMAL
BASOPHILS # BLD AUTO: 0.09 10*3/MM3 (ref 0–0.2)
BASOPHILS NFR BLD AUTO: 1.1 % (ref 0–1.5)
BUN SERPL-MCNC: 19 MG/DL (ref 8–23)
BUN/CREAT SERPL: 48.7 (ref 7–25)
CALCIUM SPEC-SCNC: 9 MG/DL (ref 8.2–9.6)
CHLORIDE SERPL-SCNC: 92 MMOL/L (ref 98–107)
CO2 SERPL-SCNC: 31.1 MMOL/L (ref 22–29)
CREAT SERPL-MCNC: 0.39 MG/DL (ref 0.57–1)
DEPRECATED RDW RBC AUTO: 40.8 FL (ref 37–54)
EGFRCR SERPLBLD CKD-EPI 2021: 93.6 ML/MIN/1.73
EOSINOPHIL # BLD AUTO: 1.01 10*3/MM3 (ref 0–0.4)
EOSINOPHIL NFR BLD AUTO: 12.1 % (ref 0.3–6.2)
ERYTHROCYTE [DISTWIDTH] IN BLOOD BY AUTOMATED COUNT: 13.8 % (ref 12.3–15.4)
GLUCOSE SERPL-MCNC: 122 MG/DL (ref 65–99)
HCT VFR BLD AUTO: 36.7 % (ref 34–46.6)
HGB BLD-MCNC: 12.1 G/DL (ref 12–15.9)
IMM GRANULOCYTES # BLD AUTO: 0.04 10*3/MM3 (ref 0–0.05)
IMM GRANULOCYTES NFR BLD AUTO: 0.5 % (ref 0–0.5)
LYMPHOCYTES # BLD AUTO: 1.67 10*3/MM3 (ref 0.7–3.1)
LYMPHOCYTES NFR BLD AUTO: 20 % (ref 19.6–45.3)
MCH RBC QN AUTO: 27.1 PG (ref 26.6–33)
MCHC RBC AUTO-ENTMCNC: 33 G/DL (ref 31.5–35.7)
MCV RBC AUTO: 82.3 FL (ref 79–97)
MONOCYTES # BLD AUTO: 1.14 10*3/MM3 (ref 0.1–0.9)
MONOCYTES NFR BLD AUTO: 13.6 % (ref 5–12)
NEUTROPHILS NFR BLD AUTO: 4.42 10*3/MM3 (ref 1.7–7)
NEUTROPHILS NFR BLD AUTO: 52.7 % (ref 42.7–76)
NRBC BLD AUTO-RTO: 0 /100 WBC (ref 0–0.2)
PLATELET # BLD AUTO: 325 10*3/MM3 (ref 140–450)
PMV BLD AUTO: 8.8 FL (ref 6–12)
POTASSIUM SERPL-SCNC: 3.9 MMOL/L (ref 3.5–5.2)
RBC # BLD AUTO: 4.46 10*6/MM3 (ref 3.77–5.28)
SODIUM SERPL-SCNC: 129 MMOL/L (ref 136–145)
WBC NRBC COR # BLD: 8.37 10*3/MM3 (ref 3.4–10.8)

## 2023-03-08 PROCEDURE — 80048 BASIC METABOLIC PNL TOTAL CA: CPT | Performed by: INTERNAL MEDICINE

## 2023-03-08 PROCEDURE — 99232 SBSQ HOSP IP/OBS MODERATE 35: CPT | Performed by: INTERNAL MEDICINE

## 2023-03-08 PROCEDURE — 25010000002 PIPERACILLIN SOD-TAZOBACTAM PER 1 G: Performed by: STUDENT IN AN ORGANIZED HEALTH CARE EDUCATION/TRAINING PROGRAM

## 2023-03-08 PROCEDURE — 97110 THERAPEUTIC EXERCISES: CPT

## 2023-03-08 PROCEDURE — 94799 UNLISTED PULMONARY SVC/PX: CPT

## 2023-03-08 PROCEDURE — 85025 COMPLETE CBC W/AUTO DIFF WBC: CPT | Performed by: INTERNAL MEDICINE

## 2023-03-08 RX ADMIN — FUROSEMIDE 40 MG: 40 TABLET ORAL at 17:24

## 2023-03-08 RX ADMIN — HYDROCORTISONE 1 APPLICATION: 1 OINTMENT TOPICAL at 08:28

## 2023-03-08 RX ADMIN — Medication 1 EACH: at 21:14

## 2023-03-08 RX ADMIN — TAZOBACTAM SODIUM AND PIPERACILLIN SODIUM 3.38 G: 375; 3 INJECTION, SOLUTION INTRAVENOUS at 21:14

## 2023-03-08 RX ADMIN — CETIRIZINE HYDROCHLORIDE 10 MG: 10 TABLET, FILM COATED ORAL at 08:28

## 2023-03-08 RX ADMIN — APIXABAN 2.5 MG: 2.5 TABLET, FILM COATED ORAL at 08:27

## 2023-03-08 RX ADMIN — Medication 1 EACH: at 11:27

## 2023-03-08 RX ADMIN — TAZOBACTAM SODIUM AND PIPERACILLIN SODIUM 3.38 G: 375; 3 INJECTION, SOLUTION INTRAVENOUS at 06:39

## 2023-03-08 RX ADMIN — DOCUSATE SODIUM 100 MG: 100 CAPSULE, LIQUID FILLED ORAL at 21:13

## 2023-03-08 RX ADMIN — HYDROCORTISONE 1 APPLICATION: 1 OINTMENT TOPICAL at 11:27

## 2023-03-08 RX ADMIN — Medication 10 ML: at 21:13

## 2023-03-08 RX ADMIN — BISACODYL 5 MG: 5 TABLET, COATED ORAL at 08:27

## 2023-03-08 RX ADMIN — POLYETHYLENE GLYCOL 3350 17 G: 17 POWDER, FOR SOLUTION ORAL at 08:27

## 2023-03-08 RX ADMIN — DOCUSATE SODIUM 100 MG: 100 CAPSULE, LIQUID FILLED ORAL at 08:28

## 2023-03-08 RX ADMIN — HYDROCORTISONE 1 APPLICATION: 1 OINTMENT TOPICAL at 17:24

## 2023-03-08 RX ADMIN — ATORVASTATIN CALCIUM 40 MG: 40 TABLET, FILM COATED ORAL at 21:13

## 2023-03-08 RX ADMIN — POTASSIUM CHLORIDE 20 MEQ: 1.5 POWDER, FOR SOLUTION ORAL at 08:27

## 2023-03-08 RX ADMIN — FUROSEMIDE 40 MG: 40 TABLET ORAL at 08:27

## 2023-03-08 RX ADMIN — LEVOTHYROXINE SODIUM 150 MCG: 0.15 TABLET ORAL at 06:03

## 2023-03-08 RX ADMIN — TAZOBACTAM SODIUM AND PIPERACILLIN SODIUM 3.38 G: 375; 3 INJECTION, SOLUTION INTRAVENOUS at 13:35

## 2023-03-08 RX ADMIN — PROBIOTIC PRODUCT - TAB 1 TABLET: TAB at 08:27

## 2023-03-08 RX ADMIN — SODIUM CHLORIDE 40 ML: 9 INJECTION, SOLUTION INTRAVENOUS at 06:39

## 2023-03-08 RX ADMIN — HYDROCORTISONE 1 APPLICATION: 1 OINTMENT TOPICAL at 21:14

## 2023-03-08 RX ADMIN — APIXABAN 2.5 MG: 2.5 TABLET, FILM COATED ORAL at 21:13

## 2023-03-08 NOTE — PROGRESS NOTES
"[facility] Progress Note      Patient Name:  Soraya Coker   MRN:  [id number]   :  10/27/1930   Date of Admission:  3/5/2023   Date of Service:  3/8/2023   PMD:  Thomas Augustin MD     Hospital Course:    89-year-old female who was sent from the nursing home because of the patient says that she was placed in the nursing home after a hospitalization in [place].  She was supposed to be there for 3 weeks for rehabilitation but has been there for 6 weeks now.  Worsening weakness.  Patient has a history of CAD, CHF, diabetes mellitus, hypertension, and sick sinus syndrome status post pacemaker placement.  Patient says over the last week she has been increasingly weak and says that she has been \"just feeling awful\".  Here in the emergency department the patient was found to have a significant urinary tract infection.  The patient says that she has been battling UTIs for several years and that usually it takes \"medicine in the hospital\" in order to clear them up.  The ER doctor asked that we admit the patient at least overnight for further evaluation and more aggressive antibiotic treatment.       Consultants:        Procedures:  Chest x-ray    Anti-infectives:    Rocephin    2023    Chief Complaint:  Follow-up of patient with complicated UTI with generalized weakness and fatigue    Subjective:     Feeling much better. Ready for discharge. Can go to rehab on Friday   Review Of Systems:  GENERAL:  No weakness , no fatigue, no time is denies any weight loss appetite is normal.  HEENT:  Denies any rhinitis, no sore throat, no diplopia , hearing is normal  Respiratory:  Denies any shortness of breath , sweating d, yspnea on exertion , cough or sputum.  CVS:  Denies any chest pain , palpitation or syncope.  Gi:  No nausea, no vomiting, no diarrhea, no hematemesis , no melena , no rectal bleeding  :  no dysuria   Musculoskeletal:  Denies any arthritis, or calf pain  "   Hematological:  No bleeding , no petechiae.  Skin:  Denies rash , pruritus , jaundice  Endocrine:  No polyuria , polydipsia , polyphagia.  CNS:  Denies any confusion , headache , or seizure disorder  Psychiatry:  No anxiety , or depression, no suicide ideation      Objective:  Vitals:    03/07/23 2100 03/08/23 0030 03/08/23 0400 03/08/23 0758   BP: 110/54 120/64 116/52 116/64   BP Location: Right arm Right arm Right arm Right arm   Patient Position: Lying Lying Lying Lying   Pulse: 79 86 77 78   Resp: 18 18 18 18   Temp: 98.3 °F (36.8 °C) 97.8 °F (36.6 °C) 97.4 °F (36.3 °C) 98.6 °F (37 °C)   TempSrc: Oral Oral Oral Oral   SpO2: 95% 96% 93%    Weight:       Height:              Physical Exam:  GENERAL APPEARANCE: Alert and oriented.  Appears comfortable.  No acute distress. Resting in bed with family at the bedside   HEENT: Atraumatic, normocephalic,  PERRLA, mucous membranes moist  NECK: supple; no JVD or thyromegaly noted  CARDIOVASCULAR: Regular rate and rhythm, no murmurs appreciated; no edema present in BLE   RESPIRATORY: good air entry bilaterally.  No wheezes, rhonchi, or rales appreciated  GASTROINTESTINAL:  Abdomen  soft; bowel sounds present, non-tender, non-distended, no organomegaly, no CVA tenderness   EXTREMITIES: Pulses equal bilaterally   MUSCULOSKELETAL:  Good muscle strength noted no obvious deformity appreciat  PSYCH: Appropriate mood and affect  Neurologic:  Alert & oriented x 3.  Normal Mental status.  Normal Cranial Nervies.  EOMI.  DIMAS.  Normal 5/5 muscular strength in both upper and lower extremities.     Labs Reviewed  CBC:    Lab Results   Component Value Date    WBC 8.37 03/08/2023    HGB 12.1 03/08/2023    HCT 36.7 03/08/2023    MCV 82.3 03/08/2023     CMP:    Lab Results   Component Value Date     (L) 03/08/2023    CO2 31.1 (H) 03/08/2023    GLUCOSE 122 (H) 03/08/2023    BUN 19 03/08/2023    CREATININE 0.39 (L) 03/08/2023    ALBUMIN 3.5 03/05/2023    CALCIUM 9.0 03/08/2023     AST 17 03/05/2023    ALT 11 03/05/2023     Lipis Panel: No results found for: CHOL, HDL   INR:    Lab Results   Component Value Date    INR 1.0 11/03/2020     Cardiac:  No results found for: CKMB, TROPONIN  No results found for: BNP  Lactate:    Lab Results   Component Value Date    LACTATE 1.2 03/05/2023    LACTATE 1.0 09/09/2022     Blood Culture:  @lastlabx(cult:2)@    Imaging:  XR Chest 1 View    Result Date: 3/5/2023  Narrative: PROCEDURE: XR CHEST 1 VW  COMPARISON: [facility], , CHEST PA/AP & LAT 2V, 2/21/2017, 11:16.  INDICATIONS: Weak/Dizzy/AMS triage protocol  FINDINGS:  In the interval left subclavian transvenous pacemaker generator pack has been exchanged.  The heart size is at the upper limits of normal.  Pulmonary vessels are normal.  Calcified granuloma seen within the right lung base.  Lungs are otherwise clear.  No pleural effusion or pneumothorax.  There are surgical clips in left axilla.      Impression:   1. 1. Interval exchange of left subclavian transvenous pacemaker pack.  2. No acute cardiopulmonary disease.  No evidence of pneumothorax.       ZOHRA MORILLO MD       Electronically Signed and Approved By: ZOHRA MORILLO MD on 3/05/2023 at 13:52                Medications Reviewed:  aluminum sulfate-calcium acetate, 1 packet, Topical, 2 times per day  apixaban, 2.5 mg, Oral, BID  atorvastatin, 40 mg, Oral, Nightly  cetirizine, 10 mg, Oral, Daily  docusate sodium, 100 mg, Oral, BID  furosemide, 40 mg, Oral, BID  hydrocortisone-bacitracin-zinc oxide-nystatin, 1 application, Topical, 4x Daily  levothyroxine, 150 mcg, Oral, QAM  miconazole, 1 application, Topical, Q24H  piperacillin-tazobactam, 3.375 g, Intravenous, Q8H  polyethylene glycol, 17 g, Oral, Daily  potassium chloride, 20 mEq, Oral, Daily  Tiny-Bid Probiotic, 1 tablet, Oral, Daily  sodium chloride, 10 mL, Intravenous, Q12H         Assessment/Plan:    Patient Active Problem List    Diagnosis    • *UTI (urinary tract  infection) [N39.0]    • Severe malnutrition (HCC) [E43]    • Acute urinary tract infection [N39.0]       Medical decision making:    E. Coli UTI     Continue IV Rocephin to complete course     Atrial fibrillation with sick sinus syndrome status post pacemaker:  Continue to monitor  Continue home medication    Type 2 diabetes mellitus:  Blood sugar ACHS  Sliding-scale.  Continue home medication    Hypertension:  Monitor blood pressure closely  Continue home antihypertensive    DVT Prophylaxis:    Lovenox  CODE STATUS:    Reason for continued hospitalization  and medical necessity   patient with complicated UTI and recent fatigue requiring further management    Orders:  CBC  CMP  Eliquis  Rocephin  Urine culture              Disposition:  Nursing home    Diet: Cardiac

## 2023-03-08 NOTE — THERAPY TREATMENT NOTE
Acute Care - Physical Therapy Treatment Note   Valerio     Patient Name: Soraya Coker  : 10/27/1930  MRN: [id number]  Today's Date: 3/8/2023      Visit Dx:     ICD-10-CM ICD-9-CM   1. Acute urinary tract infection  N39.0 599.0   2. Weakness  R53.1 780.79   3. Difficulty in walking  R26.2 719.7   4. Difficulty walking  R26.2 719.7     Patient Active Problem List   Diagnosis   • UTI (urinary tract infection)   • Severe malnutrition (HCC)   • Acute urinary tract infection     Past Medical History:   Diagnosis Date   • Arthritis    • CAD (coronary artery disease)    • CHF (congestive heart failure) ([facility])    • Diabetes mellitus (HCC)    • GERD (gastroesophageal reflux disease)    • Hyperlipidemia    • Hypertension    • Pneumonia    • Sick sinus syndrome ([facility])      Past Surgical History:   Procedure Laterality Date   • CARDIAC DEFIBRILLATOR PLACEMENT     • DACRYOCYSTORHINOSTOMY Right 10/13/2022    Procedure: RIGHT DACRYOCYSTORHINOSTOMY WITH CURTIS TUBE;  Surgeon: Ritchie Cardenas MD;  Location: [facility];  Service: Ophthalmology;  Laterality: Right;   • MASTECTOMY Left    • REPLACEMENT TOTAL KNEE Bilateral      PT Assessment (last 12 hours)     PT Evaluation and Treatment     Row Name 23 1519          Physical Therapy Time and Intention    Subjective Information complains of;weakness;fatigue (P)   -     Document Type therapy note (daily note) (P)   -JL     Mode of Treatment individual therapy;physical therapy (P)   -     Patient Effort good (P)   -     Row Name 23 151          General Information    Patient Profile Reviewed yes (P)   -JL     Patient Observations alert;cooperative;agree to therapy (P)   fatigued, hasn't eaten since breakfast  -     Row Name 23 1519          Knee (Therapeutic Exercise)    Knee (Therapeutic Exercise) AROM (active range of motion) (P)   -     Knee AROM (Therapeutic Exercise) SAQ (short arc quad);bilateral;supine;2 sets;10 repetitions;heel slides  (P)   -JL     Row Name 03/08/23 1519          Ankle (Therapeutic Exercise)    Ankle (Therapeutic Exercise) AROM (active range of motion) (P)   -JL     Ankle AROM (Therapeutic Exercise) bilateral;dorsiflexion;plantarflexion;supine;2 sets;10 repetitions (P)   -JL     Row Name             Wound 03/06/23 1530 Bilateral gluteal    Wound - Properties Group Placement Date: 03/06/23  - Placement Time: 1530  -AH Present on Hospital Admission: Y  -AH Side: Bilateral  -AH Location: gluteal  -AH    Retired Wound - Properties Group Placement Date: 03/06/23  - Placement Time: 1530  -AH Present on Hospital Admission: Y  -AH Side: Bilateral  -AH Location: gluteal  -AH    Retired Wound - Properties Group Date first assessed: 03/06/23  - Time first assessed: 1530  -AH Present on Hospital Admission: Y  -AH Side: Bilateral  -AH Location: gluteal  -AH    Row Name 03/08/23 1519          Positioning and Restraints    Pre-Treatment Position in bed (P)   -JL     Post Treatment Position bed (P)   -JL     In Bed supine;call light within reach;encouraged to call for assist;exit alarm on (P)   -JL     Row Name 03/08/23 1519          Progress Summary (PT)    Daily Progress Summary (PT) Pt. declined supine to sit transfer, stated that she is too weak and hasn't eaten since breakfast. Pt. agreed to perform supine therex, performed with no abnormal responses. Pt. left with call light in reach. (P)   -JL           User Key  (r) = Recorded By, (t) = Taken By, (c) = Cosigned By    Initials Name Provider Type    Latrice Le, RN Registered Nurse    Chau Heath, PT Student PT Student                Physical Therapy Education     Title: PT OT SLP Therapies (Done)     Topic: Physical Therapy (Done)     Point: Mobility training (Done)     Learning Progress Summary           Patient Acceptance, E, VU by BRENT at 3/6/2023 1425                               User Key     Initials Effective Dates Name Provider Type Salvador KENNEDY 06/03/21 -   Adalberto Tyson, PT Physical Therapist PT              PT Recommendation and Plan     Progress Summary (PT)  Daily Progress Summary (PT): (P) Pt. declined supine to sit transfer, stated that she is too weak and hasn't eaten since breakfast. Pt. agreed to perform supine therex, performed with no abnormal responses. Pt. left with call light in reach.   Outcome Measures     Row Name 03/08/23 1522 03/07/23 0940 03/06/23 1424       How much help from another person do you currently need...    Turning from your back to your side while in flat bed without using bedrails? 3 (P)   -JL 3  -DK 3  -BRENT    Moving from lying on back to sitting on the side of a flat bed without bedrails? 2 (P)   -JL 2  -DK 3  -BRENT    Moving to and from a bed to a chair (including a wheelchair)? 1 (P)   -JL 1  -DK 1  -BRENT    Standing up from a chair using your arms (e.g., wheelchair, bedside chair)? 1 (P)   -JL 1  -DK 1  -BRENT    Climbing 3-5 steps with a railing? 1 (P)   -JL 1  -DK 1  -BRENT    To walk in hospital room? 1 (P)   -JL 1  -DK 1  -BRENT    AM-PAC 6 Clicks Score (PT) 9 (P)   -JL 9  -DK 10  -BRENT       Functional Assessment    Outcome Measure Options AM-PAC 6 Clicks Basic Mobility (PT) (P)   -JL AM-PAC 6 Clicks Basic Mobility (PT)  -DK AM-PAC 6 Clicks Basic Mobility (PT)  -BRENT          User Key  (r) = Recorded By, (t) = Taken By, (c) = Cosigned By    Initials Name Provider Type    Maria Alejandra Cruz, PTA Physical Therapist Assistant    Adalberto Elizalde, PT Physical Therapist    Chau Heath, PT Student PT Student                 Time Calculation:    PT Charges     Row Name 03/08/23 1518             Time Calculation    PT Received On 03/08/23 (P)   -JL         Timed Charges    83303 - PT Therapeutic Exercise Minutes 14 (P)   -JL         Total Minutes    Timed Charges Total Minutes 14 (P)   -JL       Total Minutes 14 (P)   -JL            User Key  (r) = Recorded By, (t) = Taken By, (c) = Cosigned By    Initials Name Provider Type    YG Godoy,  Chau PT Student PT Student              Therapy Charges for Today     Code Description Service Date Service Provider Modifiers Qty    85873831779 HC PT THER PROC EA 15 MIN 3/8/2023 Chau Godoy, PT Student GP 1          PT G-Codes  Outcome Measure Options: (P) AM-PAC 6 Clicks Basic Mobility (PT)  AM-PAC 6 Clicks Score (PT): (P) 9    Chau Godoy PT Student  3/8/2023

## 2023-03-08 NOTE — PLAN OF CARE
Goal Outcome Evaluation:  Plan of Care Reviewed With: patient  Patient is alert and oriented this shift with intermittent forgetfulness. Iv antibiotics given. Wound care performed per orders. Turned and repositioned to prevent further skin breakdown. No other changes at this time

## 2023-03-08 NOTE — PLAN OF CARE
Goal Outcome Evaluation:  Plan of Care Reviewed With: patient     Patient is alert and oriented this shift. No c/o pain. IV antibiotics administered. Wound care performed per orders. Turned and repositioned to prevent further skin breakdown. No other changes at this time.

## 2023-03-08 NOTE — CONSULTS
Discharge Planning Assessment   Iman     Patient Name: Soraya Coker  MRN: [id number]  Today's Date: 3/8/2023    Admit Date: 3/5/2023     Discharge Plan     Row Name 03/08/23 1144       Plan    Plan Pt has a bed at [facility] on friday, 03/10. MD notified.              Continued Care and Services - Admitted Since 3/5/2023     Destination     Service Provider Request Status Selected Services Address Phone Fax Patient Preferred    [facility] Accepted N/A [address] [place] [address] [phone] --              Expected Discharge Date and Time     Expected Discharge Date Expected Discharge Time    Mar 10, 2023          Demographic Summary    No documentation.               DARCI Hardy

## 2023-03-09 LAB
ANION GAP SERPL CALCULATED.3IONS-SCNC: 9.7 MMOL/L (ref 5–15)
BASOPHILS # BLD AUTO: 0.08 10*3/MM3 (ref 0–0.2)
BASOPHILS NFR BLD AUTO: 1 % (ref 0–1.5)
BUN SERPL-MCNC: 15 MG/DL (ref 8–23)
BUN/CREAT SERPL: 30 (ref 7–25)
CALCIUM SPEC-SCNC: 8.7 MG/DL (ref 8.2–9.6)
CHLORIDE SERPL-SCNC: 94 MMOL/L (ref 98–107)
CO2 SERPL-SCNC: 29.3 MMOL/L (ref 22–29)
CREAT SERPL-MCNC: 0.5 MG/DL (ref 0.57–1)
DEPRECATED RDW RBC AUTO: 41 FL (ref 37–54)
EGFRCR SERPLBLD CKD-EPI 2021: 88.1 ML/MIN/1.73
EOSINOPHIL # BLD AUTO: 1.1 10*3/MM3 (ref 0–0.4)
EOSINOPHIL NFR BLD AUTO: 13.2 % (ref 0.3–6.2)
ERYTHROCYTE [DISTWIDTH] IN BLOOD BY AUTOMATED COUNT: 13.9 % (ref 12.3–15.4)
GLUCOSE SERPL-MCNC: 118 MG/DL (ref 65–99)
HCT VFR BLD AUTO: 36.5 % (ref 34–46.6)
HGB BLD-MCNC: 12.2 G/DL (ref 12–15.9)
IMM GRANULOCYTES # BLD AUTO: 0.05 10*3/MM3 (ref 0–0.05)
IMM GRANULOCYTES NFR BLD AUTO: 0.6 % (ref 0–0.5)
LYMPHOCYTES # BLD AUTO: 1.78 10*3/MM3 (ref 0.7–3.1)
LYMPHOCYTES NFR BLD AUTO: 21.3 % (ref 19.6–45.3)
MCH RBC QN AUTO: 27.2 PG (ref 26.6–33)
MCHC RBC AUTO-ENTMCNC: 33.4 G/DL (ref 31.5–35.7)
MCV RBC AUTO: 81.5 FL (ref 79–97)
MONOCYTES # BLD AUTO: 1.21 10*3/MM3 (ref 0.1–0.9)
MONOCYTES NFR BLD AUTO: 14.5 % (ref 5–12)
NEUTROPHILS NFR BLD AUTO: 4.12 10*3/MM3 (ref 1.7–7)
NEUTROPHILS NFR BLD AUTO: 49.4 % (ref 42.7–76)
NRBC BLD AUTO-RTO: 0 /100 WBC (ref 0–0.2)
PLATELET # BLD AUTO: 334 10*3/MM3 (ref 140–450)
PMV BLD AUTO: 8.8 FL (ref 6–12)
POTASSIUM SERPL-SCNC: 4 MMOL/L (ref 3.5–5.2)
RBC # BLD AUTO: 4.48 10*6/MM3 (ref 3.77–5.28)
SODIUM SERPL-SCNC: 133 MMOL/L (ref 136–145)
WBC NRBC COR # BLD: 8.34 10*3/MM3 (ref 3.4–10.8)

## 2023-03-09 PROCEDURE — 97110 THERAPEUTIC EXERCISES: CPT

## 2023-03-09 PROCEDURE — 85025 COMPLETE CBC W/AUTO DIFF WBC: CPT | Performed by: INTERNAL MEDICINE

## 2023-03-09 PROCEDURE — 80048 BASIC METABOLIC PNL TOTAL CA: CPT | Performed by: INTERNAL MEDICINE

## 2023-03-09 PROCEDURE — 99232 SBSQ HOSP IP/OBS MODERATE 35: CPT | Performed by: INTERNAL MEDICINE

## 2023-03-09 PROCEDURE — 25010000002 PIPERACILLIN SOD-TAZOBACTAM PER 1 G: Performed by: STUDENT IN AN ORGANIZED HEALTH CARE EDUCATION/TRAINING PROGRAM

## 2023-03-09 PROCEDURE — 94799 UNLISTED PULMONARY SVC/PX: CPT

## 2023-03-09 PROCEDURE — 94760 N-INVAS EAR/PLS OXIMETRY 1: CPT

## 2023-03-09 RX ORDER — GRANULES FOR ORAL 3 G/1
3 POWDER ORAL ONCE
Status: COMPLETED | OUTPATIENT
Start: 2023-03-09 | End: 2023-03-09

## 2023-03-09 RX ADMIN — HYDROCORTISONE 1 APPLICATION: 1 OINTMENT TOPICAL at 11:05

## 2023-03-09 RX ADMIN — Medication 1 EACH: at 21:38

## 2023-03-09 RX ADMIN — TAZOBACTAM SODIUM AND PIPERACILLIN SODIUM 3.38 G: 375; 3 INJECTION, SOLUTION INTRAVENOUS at 07:17

## 2023-03-09 RX ADMIN — TAZOBACTAM SODIUM AND PIPERACILLIN SODIUM 3.38 G: 375; 3 INJECTION, SOLUTION INTRAVENOUS at 12:34

## 2023-03-09 RX ADMIN — ATORVASTATIN CALCIUM 40 MG: 40 TABLET, FILM COATED ORAL at 21:38

## 2023-03-09 RX ADMIN — FOSFOMYCIN TROMETHAMINE 3 G: 3 POWDER ORAL at 14:25

## 2023-03-09 RX ADMIN — HYDROCORTISONE 1 APPLICATION: 1 OINTMENT TOPICAL at 21:38

## 2023-03-09 RX ADMIN — DOCUSATE SODIUM 100 MG: 100 CAPSULE, LIQUID FILLED ORAL at 10:14

## 2023-03-09 RX ADMIN — MICONAZOLE NITRATE 1 APPLICATION: 20 CREAM TOPICAL at 10:18

## 2023-03-09 RX ADMIN — Medication 10 ML: at 10:17

## 2023-03-09 RX ADMIN — POLYETHYLENE GLYCOL 3350 17 G: 17 POWDER, FOR SOLUTION ORAL at 10:14

## 2023-03-09 RX ADMIN — HYDROCORTISONE 1 APPLICATION: 1 OINTMENT TOPICAL at 08:30

## 2023-03-09 RX ADMIN — PROBIOTIC PRODUCT - TAB 1 TABLET: TAB at 10:14

## 2023-03-09 RX ADMIN — HYDROCORTISONE 1 APPLICATION: 1 OINTMENT TOPICAL at 18:39

## 2023-03-09 RX ADMIN — POTASSIUM CHLORIDE 20 MEQ: 1.5 POWDER, FOR SOLUTION ORAL at 10:14

## 2023-03-09 RX ADMIN — APIXABAN 2.5 MG: 2.5 TABLET, FILM COATED ORAL at 10:14

## 2023-03-09 RX ADMIN — FUROSEMIDE 40 MG: 40 TABLET ORAL at 10:14

## 2023-03-09 RX ADMIN — LEVOTHYROXINE SODIUM 150 MCG: 0.15 TABLET ORAL at 07:17

## 2023-03-09 RX ADMIN — CETIRIZINE HYDROCHLORIDE 10 MG: 10 TABLET, FILM COATED ORAL at 10:14

## 2023-03-09 RX ADMIN — Medication 10 ML: at 21:38

## 2023-03-09 RX ADMIN — FUROSEMIDE 40 MG: 40 TABLET ORAL at 18:39

## 2023-03-09 RX ADMIN — Medication 1 EACH: at 10:15

## 2023-03-09 RX ADMIN — HYDROCORTISONE 1 APPLICATION: 1 OINTMENT TOPICAL at 12:36

## 2023-03-09 RX ADMIN — APIXABAN 2.5 MG: 2.5 TABLET, FILM COATED ORAL at 21:38

## 2023-03-09 RX ADMIN — Medication 10 ML: at 14:25

## 2023-03-09 NOTE — PLAN OF CARE
Goal Outcome Evaluation:           Progress: improving  Outcome Evaluation: denies pain/discomfort this shift. has severe anxiety about not being taken care of d/t previous issues at a nursing facility. patient is looking forward to discharging tomorrow to rehab facility of choice. continues with isolation per policy. wound care per orders. no new issues/needs noted at this time.

## 2023-03-09 NOTE — THERAPY TREATMENT NOTE
Acute Care - Physical Therapy Treatment Note   [facility]     Patient Name: Soraya Coker  : 10/27/1930  MRN: [id number]  Today's Date: 3/9/2023      Visit Dx:     ICD-10-CM ICD-9-CM   1. Acute urinary tract infection  N39.0 599.0   2. Weakness  R53.1 780.79   3. Difficulty in walking  R26.2 719.7   4. Difficulty walking  R26.2 719.7     Patient Active Problem List   Diagnosis   • UTI (urinary tract infection)   • Severe malnutrition (HCC)   • Acute urinary tract infection     Past Medical History:   Diagnosis Date   • Arthritis    • CAD (coronary artery disease)    • CHF (congestive heart failure) ([facility])    • Diabetes mellitus (HCC)    • GERD (gastroesophageal reflux disease)    • Hyperlipidemia    • Hypertension    • Pneumonia    • Sick sinus syndrome ([facility])      Past Surgical History:   Procedure Laterality Date   • CARDIAC DEFIBRILLATOR PLACEMENT     • DACRYOCYSTORHINOSTOMY Right 10/13/2022    Procedure: RIGHT DACRYOCYSTORHINOSTOMY WITH CURTIS TUBE;  Surgeon: Ritchie Cardenas MD;  Location: [facility];  Service: Ophthalmology;  Laterality: Right;   • MASTECTOMY Left    • REPLACEMENT TOTAL KNEE Bilateral      PT Assessment (last 12 hours)     PT Evaluation and Treatment     Row Name 23          Physical Therapy Time and Intention    Subjective Information complains of;weakness;fatigue  -DK     Document Type therapy note (daily note)  -DK     Mode of Treatment individual therapy;physical therapy  -DK     Patient Effort good  -DK     Symptoms Noted During/After Treatment fatigue  -DK     Row Name 2355          Pain    Pretreatment Pain Rating 0/10 - no pain  -DK     Posttreatment Pain Rating 0/10 - no pain  -DK     Row Name 2355          Cognition    Affect/Mental Status (Cognition) WFL  -DK     Orientation Status (Cognition) oriented x 4  -DK     Follows Commands (Cognition) WFL  -DK     Cognitive Function WFL  -DK     Personal Safety Interventions nonskid shoes/slippers  when out of bed;supervised activity  -     Row Name 03/09/23 0955          Motor Skills    Motor Skills --  therapeutic exercises  -DK     Therapeutic Exercise hip;knee;ankle  -DK     Additional Documentation --  Pt declined transfers.  -     Row Name 03/09/23 0955          Hip (Therapeutic Exercise)    Hip (Therapeutic Exercise) AAROM (active assistive range of motion)  -     Hip AAROM (Therapeutic Exercise) bilateral;flexion;extension;aBduction;aDduction;supine;10 repetitions;2 sets  -     Row Name 03/09/23 0955          Knee (Therapeutic Exercise)    Knee (Therapeutic Exercise) AAROM (active assistive range of motion)  -     Knee AAROM (Therapeutic Exercise) bilateral;flexion;extension;supine;10 repetitions;2 sets  -     Row Name 03/09/23 0955          Ankle (Therapeutic Exercise)    Ankle (Therapeutic Exercise) AAROM (active assistive range of motion)  -     Ankle AAROM (Therapeutic Exercise) bilateral;dorsiflexion;plantarflexion;supine;10 repetitions;2 sets  -     Row Name             Wound 03/06/23 1530 Bilateral gluteal    Wound - Properties Group Placement Date: 03/06/23  - Placement Time: 1530  -AH Present on Hospital Admission: Y  -AH Side: Bilateral  -AH Location: gluteal  -AH    Retired Wound - Properties Group Placement Date: 03/06/23  - Placement Time: 1530  -AH Present on Hospital Admission: Y  -AH Side: Bilateral  -AH Location: gluteal  -AH    Retired Wound - Properties Group Date first assessed: 03/06/23  - Time first assessed: 1530  -AH Present on Hospital Admission: Y  -AH Side: Bilateral  -AH Location: gluteal  -AH    Row Name 03/09/23 0955          Plan of Care Review    Plan of Care Reviewed With patient  -DK     Progress no change  -     Row Name 03/09/23 0955          Positioning and Restraints    Pre-Treatment Position in bed  -DK     Post Treatment Position bed  -DK     In Bed supine;call light within reach;encouraged to call for assist;exit alarm on;side rails up  x2;legs elevated;heels elevated  -DK     Row Name 03/09/23 0955          Therapy Assessment/Plan (PT)    Rehab Potential (PT) fair, will monitor progress closely  -DK     Criteria for Skilled Interventions Met (PT) skilled treatment is necessary  -DK     Therapy Frequency (PT) daily  -DK     Problem List (PT) problems related to;balance;mobility;range of motion (ROM);strength;pain;hearing  -DK     Activity Limitations Related to Problem List (PT) unable to ambulate safely;unable to transfer safely  -DK     Row Name 03/09/23 0955          Progress Summary (PT)    Progress Toward Functional Goals (PT) progress toward functional goals is fair  -DK           User Key  (r) = Recorded By, (t) = Taken By, (c) = Cosigned By    Initials Name Provider Type    Maria Alejandra Cruz PTA Physical Therapist Assistant    Latrice Le RN Registered Nurse                Physical Therapy Education     Title: PT OT SLP Therapies (Done)     Topic: Physical Therapy (Done)     Point: Mobility training (Done)     Learning Progress Summary           Patient Acceptance, E, VU by BRENT at 3/6/2023 1425                               User Key     Initials Effective Dates Name Provider Type Discipline    BRENT 06/03/21 -  Adalberto Tyson, PT Physical Therapist PT              PT Recommendation and Plan  Planned Therapy Interventions (PT): balance training, bed mobility training, strengthening, transfer training  Therapy Frequency (PT): daily  Progress Summary (PT)  Progress Toward Functional Goals (PT): progress toward functional goals is fair  Plan of Care Reviewed With: patient  Progress: no change   Outcome Measures     Row Name 03/09/23 0954 03/08/23 1522 03/07/23 0940       How much help from another person do you currently need...    Turning from your back to your side while in flat bed without using bedrails? 3  -DK 3  -BRENT (r) YG (t) BRENT (c) 3  -DK    Moving from lying on back to sitting on the side of a flat bed without bedrails? 2  -DK 2   -BRENT (r) JL (t) BRENT (c) 2  -DK    Moving to and from a bed to a chair (including a wheelchair)? 1  -DK 1  -BRENT (r) JL (t) BRENT (c) 1  -DK    Standing up from a chair using your arms (e.g., wheelchair, bedside chair)? 1  -DK 1  -BRENT (r) JL (t) BRENT (c) 1  -DK    Climbing 3-5 steps with a railing? 1  -DK 1  -BRENT (r) JL (t) BRENT (c) 1  -DK    To walk in hospital room? 1  -DK 1  -BRENT (r) JL (t) BRENT (c) 1  -DK    AM-PAC 6 Clicks Score (PT) 9  -DK 9  -BRENT (r) JL (t) 9  -DK       Functional Assessment    Outcome Measure Options AM-PAC 6 Clicks Basic Mobility (PT)  -DK AM-PAC 6 Clicks Basic Mobility (PT)  -BRENT (r) JL (t) BRENT (c) AM-PAC 6 Clicks Basic Mobility (PT)  -DK    Row Name 03/06/23 1426             How much help from another person do you currently need...    Turning from your back to your side while in flat bed without using bedrails? 3  -BRENT      Moving from lying on back to sitting on the side of a flat bed without bedrails? 3  -BRENT      Moving to and from a bed to a chair (including a wheelchair)? 1  -BRENT      Standing up from a chair using your arms (e.g., wheelchair, bedside chair)? 1  -BRENT      Climbing 3-5 steps with a railing? 1  -BRENT      To walk in hospital room? 1  -BRENT      AM-PAC 6 Clicks Score (PT) 10  -BRENT         Functional Assessment    Outcome Measure Options AM-PAC 6 Clicks Basic Mobility (PT)  -BRENT            User Key  (r) = Recorded By, (t) = Taken By, (c) = Cosigned By    Initials Name Provider Type    Maria Alejandra Cruz, PTA Physical Therapist Assistant    Adalberto Elizalde, PT Physical Therapist    Chau Heath, PT Student PT Student                 Time Calculation:    PT Charges     Row Name 03/09/23 0945             Time Calculation    PT Received On 03/09/23  -DK      PT Goal Re-Cert Due Date 03/15/23  -DK         Timed Charges    63009 - PT Therapeutic Exercise Minutes 14  -DK      67595 - PT Therapeutic Activity Minutes 3  -DK         Total Minutes    Timed Charges Total Minutes 17  -DK       Total  Minutes 17  -DK            User Key  (r) = Recorded By, (t) = Taken By, (c) = Cosigned By    Initials Name Provider Type    Maria Alejandra Cruz PTA Physical Therapist Assistant              Therapy Charges for Today     Code Description Service Date Service Provider Modifiers Qty    18369261110 HC PT THER PROC EA 15 MIN 3/9/2023 Maria Alejandra Pittman PTA GP 1          PT G-Codes  Outcome Measure Options: AM-PAC 6 Clicks Basic Mobility (PT)  AM-PAC 6 Clicks Score (PT): 9    Maria Alejandra Pittman PTA  3/9/2023

## 2023-03-09 NOTE — CONSULTS
"Nutrition Services    Patient Name: Soraya Coker  YOB: 1930  MRN: [id number]  Admission date: 3/5/2023      CLINICAL NUTRITION ASSESSMENT      Reason for Assessment  Follow-up protocol   H&P:    Past Medical History:   Diagnosis Date   • Arthritis    • CAD (coronary artery disease)    • CHF (congestive heart failure) ([facility])    • Diabetes mellitus (HCC)    • GERD (gastroesophageal reflux disease)    • Hyperlipidemia    • Hypertension    • Pneumonia    • Sick sinus syndrome ([facility])         Current Problems:   Active Hospital Problems    Diagnosis    • **UTI (urinary tract infection)    • Severe malnutrition (HCC)    • Acute urinary tract infection         Nutrition/Diet History         Narrative     Pt eating breakfast when RD visited. Pt request no muffins for breakfast, prefers toast with SF jelly.  Pt liking the Boost Glucose Control but her Ke was missing of her tray this morning.  Discussed the issue with the  staff.  Pt to be discharged to rehab facility tomorrow.  Recommend to continue Ke BID for wound healing along with the Boost Glucose Control.     Anthropometrics        Current Height, Weight Height: 157.5 cm (62.01\")  Weight: 78.1 kg (172 lb 2.9 oz)   Current BMI Body mass index is 31.48 kg/m².       Weight Hx  Wt Readings from Last 30 Encounters:   03/05/23 2027 78.1 kg (172 lb 2.9 oz)   03/05/23 1915 78.1 kg (172 lb 2.9 oz)   03/05/23 1311 79.3 kg (174 lb 13.2 oz)   10/13/22 0732 82.6 kg (182 lb)   09/09/22 1201 88.8 kg (195 lb 12.3 oz)            Wt Change Observation 10# since October, ~5.5%     Estimated/Assessed Needs       Energy Requirements    EST Needs (kcal/day) 5530-3251 (MSJ *1.4-1.5)       Protein Requirements    EST Daily Needs (g/day) 78 (1 gm)       Fluid Requirements     Estimated Needs (mL/day) 1602     Labs/Medications         Pertinent Labs Reviewed.   Results from last 7 days   Lab Units 03/09/23  0608 03/08/23  0526 03/07/23  0552 03/06/23  0603 " 03/05/23  1451   SODIUM mmol/L 133* 129* 132*   < > 131*   POTASSIUM mmol/L 4.0 3.9 4.1   < > 4.0   CHLORIDE mmol/L 94* 92* 94*   < > 92*   CO2 mmol/L 29.3* 31.1* 29.7*   < > 29.1*   BUN mg/dL 15 19 12   < > 13   CREATININE mg/dL 0.50* 0.39* 0.47*   < > 0.54*   CALCIUM mg/dL 8.7 9.0 8.7   < > 8.9   BILIRUBIN mg/dL  --   --   --   --  0.6   ALK PHOS U/L  --   --   --   --  125*   ALT (SGPT) U/L  --   --   --   --  11   AST (SGOT) U/L  --   --   --   --  17   GLUCOSE mg/dL 118* 122* 115*   < > 133*    < > = values in this interval not displayed.     Results from last 7 days   Lab Units 03/09/23  0608 03/06/23  1114 03/06/23  0603 03/05/23  1451   MAGNESIUM mg/dL  --   --  1.8 1.7   HEMOGLOBIN g/dL 12.2   < > 12.3 13.2   HEMATOCRIT % 36.5   < > 38.0 40.7    < > = values in this interval not displayed.       Pertinent Medications Reviewed.     Current Nutrition Orders & Evaluation of Intake       Oral Nutrition     Current PO Diet Diet: Diabetic Diets; Consistent Carbohydrate; Texture: Regular Texture (IDDSI 7); Fluid Consistency: Thin (IDDSI 0)   Supplement Orders Placed This Encounter      Dietary Nutrition Supplements Boost Glucose Control (Glucerna Shake); vanilla      Dietary Nutrition Supplements Ke; orange       Malnutrition Severity Assessment      Patient meets criteria for : Severe Malnutrition       Nutrition Diagnosis         Nutrition Dx Problem 1 Severe malnutrition related to inadequate oral Intake as evidenced by decreased appetite., unintended wt change., body composition changes., patient report. and bilateral PI to gluteal     Nutrition Intervention         Continue carb consistent, heart healthy diet  Continue Boost Glucose Control TID (570 kcal & 28 gm protein)  Continue Ke BID for arginine     Medical Nutrition Therapy/Nutrition Education          Learner     Readiness Patient  Acceptance     Method     Response Explanation  Verbalizes understanding     Monitor/Evaluation        Monitor PO  intake, Supplement intake, Pertinent labs, Weight, Skin status     Nutrition Discharge Plan         continue Boost Glucose Control along with Ke     Electronically signed by:  Tiesha Milner RD  03/09/23 09:56 EST

## 2023-03-09 NOTE — PROGRESS NOTES
"[facility] Progress Note      Patient Name:  Soraya Coker   MRN:  [id number]   :  10/27/1930   Date of Admission:  3/5/2023   Date of Service:  3/9/2023   PMD:  Thomas Augustin MD     Hospital Course:    89-year-old female who was sent from the nursing home because of the patient says that she was placed in the nursing home after a hospitalization in [place].  She was supposed to be there for 3 weeks for rehabilitation but has been there for 6 weeks now.  Worsening weakness.  Patient has a history of CAD, CHF, diabetes mellitus, hypertension, and sick sinus syndrome status post pacemaker placement.  Patient says over the last week she has been increasingly weak and says that she has been \"just feeling awful\".  Here in the emergency department the patient was found to have a significant urinary tract infection.  The patient says that she has been battling UTIs for several years and that usually it takes \"medicine in the hospital\" in order to clear them up.  The ER doctor asked that we admit the patient at least overnight for further evaluation and more aggressive antibiotic treatment.       Consultants:        Procedures:  Chest x-ray    Anti-infectives:    Rocephin    2023    Chief Complaint:  Follow-up of patient with complicated UTI with generalized weakness and fatigue    Subjective:     Feeling much better. Ready for discharge. Can go to rehab tomorrow  No new issues today   Review Of Systems:  All systems reviewed and negative for now    Objective:  Vitals:    23 1659 23 1900 23 2235 23 0400   BP:  116/62 98/46 102/48   BP Location:  Right arm Right arm Right arm   Patient Position:  Lying Lying Lying   Pulse:  84 78 81   Resp:  18 16 16   Temp:  98.4 °F (36.9 °C) 99.1 °F (37.3 °C) 98.8 °F (37.1 °C)   TempSrc:  Oral Oral Oral   SpO2: 94% 96% 90% 90%   Weight:       Height:              Physical Exam:  GENERAL APPEARANCE: Alert and " oriented.  Appears comfortable.  No acute distress. No family at the bedside  HEENT: Atraumatic, normocephalic,  PERRLA, mucous membranes moist  NECK: supple; no JVD or thyromegaly noted  CARDIOVASCULAR: Regular rate and rhythm, no murmurs appreciated; no edema present in BLE   RESPIRATORY: good air entry bilaterally.  No wheezes, rhonchi, or rales appreciated  GASTROINTESTINAL:  Abdomen  soft; bowel sounds present, non-tender, non-distended, no organomegaly, no CVA tenderness   EXTREMITIES: Pulses equal bilaterally   MUSCULOSKELETAL:  Good muscle strength noted no obvious deformity appreciat  PSYCH: Appropriate mood and affect  Neurologic:  Alert & oriented x 3.  Normal Mental status.  Normal Cranial Nervies.  EOMI.  DIMAS.  Normal 5/5 muscular strength in both upper and lower extremities.     Labs Reviewed  CBC:    Lab Results   Component Value Date    WBC 8.34 03/09/2023    HGB 12.2 03/09/2023    HCT 36.5 03/09/2023    MCV 81.5 03/09/2023     CMP:    Lab Results   Component Value Date     (L) 03/09/2023    CO2 29.3 (H) 03/09/2023    GLUCOSE 118 (H) 03/09/2023    BUN 15 03/09/2023    CREATININE 0.50 (L) 03/09/2023    ALBUMIN 3.5 03/05/2023    CALCIUM 8.7 03/09/2023    AST 17 03/05/2023    ALT 11 03/05/2023     Lipis Panel: No results found for: CHOL, HDL   INR:    Lab Results   Component Value Date    INR 1.0 11/03/2020     Cardiac:  No results found for: CKMB, TROPONIN  No results found for: BNP  Lactate:    Lab Results   Component Value Date    LACTATE 1.2 03/05/2023    LACTATE 1.0 09/09/2022     Blood Culture:  @lastlabx(cult:2)@    Imaging:  XR Chest 1 View    Result Date: 3/5/2023  Narrative: PROCEDURE: XR CHEST 1 VW  COMPARISON: [facility], CR, CHEST PA/AP & LAT 2V, 2/21/2017, 11:16.  INDICATIONS: Weak/Dizzy/AMS triage protocol  FINDINGS:  In the interval left subclavian transvenous pacemaker generator pack has been exchanged.  The heart size is at the upper limits of normal.  Pulmonary  vessels are normal.  Calcified granuloma seen within the right lung base.  Lungs are otherwise clear.  No pleural effusion or pneumothorax.  There are surgical clips in left axilla.      Impression:   1. 1. Interval exchange of left subclavian transvenous pacemaker pack.  2. No acute cardiopulmonary disease.  No evidence of pneumothorax.       ZOHRA MORILLO MD       Electronically Signed and Approved By: ZOHRA MORILLO MD on 3/05/2023 at 13:52                Medications Reviewed:  aluminum sulfate-calcium acetate, 1 packet, Topical, 2 times per day  apixaban, 2.5 mg, Oral, BID  atorvastatin, 40 mg, Oral, Nightly  cetirizine, 10 mg, Oral, Daily  docusate sodium, 100 mg, Oral, BID  furosemide, 40 mg, Oral, BID  hydrocortisone-bacitracin-zinc oxide-nystatin, 1 application, Topical, 4x Daily  levothyroxine, 150 mcg, Oral, QAM  miconazole, 1 application, Topical, Q24H  piperacillin-tazobactam, 3.375 g, Intravenous, Q8H  polyethylene glycol, 17 g, Oral, Daily  potassium chloride, 20 mEq, Oral, Daily  Tiny-Bid Probiotic, 1 tablet, Oral, Daily  sodium chloride, 10 mL, Intravenous, Q12H         Assessment/Plan:    Patient Active Problem List    Diagnosis    • *UTI (urinary tract infection) [N39.0]    • Severe malnutrition (HCC) [E43]    • Acute urinary tract infection [N39.0]       Medical decision making:    E. Coli UTI     Continue Zosyn to complete course     Atrial fibrillation with sick sinus syndrome status post pacemaker:  Continue to monitor  Continue home medication    Type 2 diabetes mellitus:  Blood sugar ACHS  Sliding-scale.  Continue home medication    Hypertension:  Monitor blood pressure closely  Continue home antihypertensive    DVT Prophylaxis:    Lovenox  CODE STATUS:    Reason for continued hospitalization  and medical necessity   patient with complicated UTI and recent fatigue requiring further management    Orders:  CBC  CMP  Eliquis  Rocephin  Urine culture              Disposition:  Nursing  home    Diet: Cardiac

## 2023-03-09 NOTE — PLAN OF CARE
Goal Outcome Evaluation:  Plan of Care Reviewed With: patient        Progress: no change  Outcome Evaluation: No issues observed or reported overnight. Patient pending discharge

## 2023-03-10 VITALS
HEART RATE: 70 BPM | WEIGHT: 172.18 LBS | OXYGEN SATURATION: 94 % | DIASTOLIC BLOOD PRESSURE: 60 MMHG | TEMPERATURE: 98.4 F | RESPIRATION RATE: 18 BRPM | HEIGHT: 62 IN | BODY MASS INDEX: 31.68 KG/M2 | SYSTOLIC BLOOD PRESSURE: 120 MMHG

## 2023-03-10 LAB
ANION GAP SERPL CALCULATED.3IONS-SCNC: 8.6 MMOL/L (ref 5–15)
BASOPHILS # BLD AUTO: 0.08 10*3/MM3 (ref 0–0.2)
BASOPHILS NFR BLD AUTO: 0.8 % (ref 0–1.5)
BUN SERPL-MCNC: 12 MG/DL (ref 8–23)
BUN/CREAT SERPL: 25.5 (ref 7–25)
CALCIUM SPEC-SCNC: 8.6 MG/DL (ref 8.2–9.6)
CHLORIDE SERPL-SCNC: 93 MMOL/L (ref 98–107)
CO2 SERPL-SCNC: 30.4 MMOL/L (ref 22–29)
CREAT SERPL-MCNC: 0.47 MG/DL (ref 0.57–1)
DEPRECATED RDW RBC AUTO: 40.7 FL (ref 37–54)
EGFRCR SERPLBLD CKD-EPI 2021: 89.4 ML/MIN/1.73
EOSINOPHIL # BLD AUTO: 1.01 10*3/MM3 (ref 0–0.4)
EOSINOPHIL NFR BLD AUTO: 10.4 % (ref 0.3–6.2)
ERYTHROCYTE [DISTWIDTH] IN BLOOD BY AUTOMATED COUNT: 13.8 % (ref 12.3–15.4)
GLUCOSE SERPL-MCNC: 128 MG/DL (ref 65–99)
HCT VFR BLD AUTO: 37.3 % (ref 34–46.6)
HGB BLD-MCNC: 12.6 G/DL (ref 12–15.9)
IMM GRANULOCYTES # BLD AUTO: 0.03 10*3/MM3 (ref 0–0.05)
IMM GRANULOCYTES NFR BLD AUTO: 0.3 % (ref 0–0.5)
LYMPHOCYTES # BLD AUTO: 1.5 10*3/MM3 (ref 0.7–3.1)
LYMPHOCYTES NFR BLD AUTO: 15.4 % (ref 19.6–45.3)
MCH RBC QN AUTO: 27.4 PG (ref 26.6–33)
MCHC RBC AUTO-ENTMCNC: 33.8 G/DL (ref 31.5–35.7)
MCV RBC AUTO: 81.1 FL (ref 79–97)
MONOCYTES # BLD AUTO: 1.06 10*3/MM3 (ref 0.1–0.9)
MONOCYTES NFR BLD AUTO: 10.9 % (ref 5–12)
NEUTROPHILS NFR BLD AUTO: 6.07 10*3/MM3 (ref 1.7–7)
NEUTROPHILS NFR BLD AUTO: 62.2 % (ref 42.7–76)
NRBC BLD AUTO-RTO: 0 /100 WBC (ref 0–0.2)
PLATELET # BLD AUTO: 324 10*3/MM3 (ref 140–450)
PMV BLD AUTO: 8.5 FL (ref 6–12)
POTASSIUM SERPL-SCNC: 3.2 MMOL/L (ref 3.5–5.2)
RBC # BLD AUTO: 4.6 10*6/MM3 (ref 3.77–5.28)
SODIUM SERPL-SCNC: 132 MMOL/L (ref 136–145)
WBC NRBC COR # BLD: 9.75 10*3/MM3 (ref 3.4–10.8)

## 2023-03-10 PROCEDURE — 85025 COMPLETE CBC W/AUTO DIFF WBC: CPT | Performed by: INTERNAL MEDICINE

## 2023-03-10 PROCEDURE — 99239 HOSP IP/OBS DSCHRG MGMT >30: CPT | Performed by: INTERNAL MEDICINE

## 2023-03-10 PROCEDURE — 80048 BASIC METABOLIC PNL TOTAL CA: CPT | Performed by: INTERNAL MEDICINE

## 2023-03-10 RX ORDER — POTASSIUM CHLORIDE 750 MG/1
40 CAPSULE, EXTENDED RELEASE ORAL ONCE
Status: COMPLETED | OUTPATIENT
Start: 2023-03-10 | End: 2023-03-10

## 2023-03-10 RX ADMIN — CETIRIZINE HYDROCHLORIDE 10 MG: 10 TABLET, FILM COATED ORAL at 08:38

## 2023-03-10 RX ADMIN — Medication 1 EACH: at 08:38

## 2023-03-10 RX ADMIN — POTASSIUM CHLORIDE 20 MEQ: 1.5 POWDER, FOR SOLUTION ORAL at 08:38

## 2023-03-10 RX ADMIN — PROBIOTIC PRODUCT - TAB 1 TABLET: TAB at 08:38

## 2023-03-10 RX ADMIN — DOCUSATE SODIUM 100 MG: 100 CAPSULE, LIQUID FILLED ORAL at 08:38

## 2023-03-10 RX ADMIN — MICONAZOLE NITRATE 1 APPLICATION: 20 CREAM TOPICAL at 08:39

## 2023-03-10 RX ADMIN — FUROSEMIDE 40 MG: 40 TABLET ORAL at 08:38

## 2023-03-10 RX ADMIN — POTASSIUM CHLORIDE 40 MEQ: 10 CAPSULE, COATED, EXTENDED RELEASE ORAL at 08:38

## 2023-03-10 RX ADMIN — HYDROCORTISONE 1 APPLICATION: 1 OINTMENT TOPICAL at 08:38

## 2023-03-10 RX ADMIN — APIXABAN 2.5 MG: 2.5 TABLET, FILM COATED ORAL at 08:38

## 2023-03-10 RX ADMIN — LEVOTHYROXINE SODIUM 150 MCG: 0.15 TABLET ORAL at 06:17

## 2023-03-10 NOTE — DISCHARGE SUMMARY
"               [facility]         HOSPITALIST  DISCHARGE SUMMARY    Patient Name: Soraya Coker  : 10/27/1930  MRN: [id number]    Date of Admission: 3/5/2023  Date of Discharge:  3/10/2023    Primary Care Physician: Thomas Augustin MD    Consults     Date and Time Order Name Status Description    3/5/2023  5:01 PM Inpatient Hospitalist Consult            Active and Resolved Hospital Problems:  Active Hospital Problems    Diagnosis POA   • **UTI (urinary tract infection) [N39.0] Yes   • Severe malnutrition (HCC) [E43] Yes   • Acute urinary tract infection [N39.0] Yes      Resolved Hospital Problems   No resolved problems to display.       Hospital Course     Hospital Course:  Soraya Coker is a 89 y.o. female 89-year-old female who was sent from the nursing home.   She was supposed to be there for 3 weeks for rehabilitation but has been there for 6 weeks now.  Worsening weakness.  Patient has a history of CAD, CHF, diabetes mellitus, hypertension, and sick sinus syndrome status post pacemaker placement.  Patient says over the last week she has been increasingly weak and says that she has been \"just feeling awful\".  Here in the emergency department the patient was found to have a significant urinary tract infection.  The patient says that she has been battling UTIs for several years and that usually it takes \"medicine in the hospital\" in order to clear them up.  The ER doctor asked that we admit the patient at least overnight for further evaluation and more aggressive antibiotic treatment.  Patient was noted to have ESBL E. coli urine infection.  Patient completed a course of IV antibiotics.  Patient is feeling much better therefore will be discharged to a long-term nursing home today in stable condition.       Day of Discharge     Vital Signs:  Temp:  [97.9 °F (36.6 °C)-98.4 °F (36.9 °C)] 97.9 °F (36.6 °C)  Heart Rate:  [76-95] 79  Resp:  [16-18] 18  BP: ()/(52-62) 90/60  Flow (L/min):  [0.5] " 0.5  Physical Exam:   GENERAL APPEARANCE: Alert and oriented.  Appears comfortable.  No acute distress. No family at the bedside  HEENT: Atraumatic, normocephalic,  PERRLA, mucous membranes moist  NECK: supple; no JVD or thyromegaly noted  CARDIOVASCULAR: Regular rate and rhythm, no murmurs appreciated; no edema present in BLE   RESPIRATORY: good air entry bilaterally.  No wheezes, rhonchi, or rales appreciated  GASTROINTESTINAL:  Abdomen  soft; bowel sounds present, non-tender, non-distended, no organomegaly, no CVA tenderness   EXTREMITIES: Pulses equal bilaterally   MUSCULOSKELETAL:  Good muscle strength noted no obvious deformity appreciat  PSYCH: Appropriate mood and affect  Neurologic:  Alert & oriented x 3.  Normal Mental status.  Normal Cranial Nervies.  EOMI.  DIMAS.  Normal 5/5 muscular strength in both upper and lower extremities.          Discharge Details        Discharge Medications      Changes to Medications      Instructions Start Date   apixaban 2.5 MG tablet tablet  Commonly known as: ELIQUIS  What changed:   medication strength  how much to take   2.5 mg, Oral, 2 Times Daily         Continue These Medications      Instructions Start Date   acetaminophen 650 MG 8 hr tablet  Commonly known as: TYLENOL   650 mg, Oral, Every 8 Hours PRN      atorvastatin 40 MG tablet  Commonly known as: LIPITOR   40 mg, Oral, Daily      bisacodyl 5 MG EC tablet  Commonly known as: DULCOLAX   5 mg, Oral, Daily PRN      clotrimazole 1 % cream  Commonly known as: LOTRIMIN   1 application, Topical, 2 Times Daily      diphenhydrAMINE 25 mg capsule  Commonly known as: BENADRYL   25 mg, Oral, Every 6 Hours PRN      docusate sodium 100 MG capsule  Commonly known as: COLACE   100 mg, Oral, 2 Times Daily      furosemide 40 MG tablet  Commonly known as: LASIX   40 mg, Oral, 2 Times Daily      ipratropium-albuterol 0.5-2.5 mg/3 ml nebulizer  Commonly known as: DUO-NEB   3 mL, Nebulization, Every 4 Hours PRN      lactobacillus  tablet caplet   1 tablet, Oral, 2 Times Daily      levothyroxine 150 MCG tablet  Commonly known as: SYNTHROID, LEVOTHROID   150 mcg, Oral, Daily      Loratadine 10 MG capsule   10 mg, Oral, Daily      neomycin-bacitracin-polymyxin 5-400-5000 ointment   1 application, Topical, 4 Times Daily      nystatin 163871 UNIT/GM powder  Commonly known as: MYCOSTATIN   1 application, Topical, 4 Times Daily      ondansetron 4 MG tablet  Commonly known as: ZOFRAN   4 mg, Oral, Every 8 Hours PRN      polyethylene glycol 17 g packet  Commonly known as: MIRALAX   17 g, Oral, Daily      potassium chloride 20 MEQ CR tablet  Commonly known as: K-DUR,KLOR-CON   40 mEq, Oral, 2 Times Daily      simethicone 125 MG chewable tablet  Commonly known as: MYLICON   125 mg, Oral, Every 6 Hours PRN             Allergies   Allergen Reactions   • Latex Other (See Comments)     Blisters  Blisters  Other reaction(s): Other (See Comments)  Blisters  Blisters  Blisters     • Adhesive Tape Rash       Discharge Disposition:  Rehab Facility or Unit (DC - External)    Diet:  Hospital:  Diet Order   Procedures   • Diet: Diabetic Diets; Consistent Carbohydrate; Texture: Regular Texture (IDDSI 7); Fluid Consistency: Thin (IDDSI 0)       Discharge Activity:  As tolerated       CODE STATUS:  Code Status and Medical Interventions:   Ordered at: 03/05/23 0766     Level Of Support Discussed With:    Patient     Code Status (Patient has no pulse and is not breathing):    CPR (Attempt to Resuscitate)     Medical Interventions (Patient has pulse or is breathing):    Full Support         No future appointments.    Additional Instructions for the Follow-ups that You Need to Schedule     Discharge Follow-up with PCP   As directed       Currently Documented PCP:    Thomas Augustin MD    PCP Phone Number:    [phone]     Follow Up Details: once d/c from rehab               Pertinent  and/or Most Recent Results     PROCEDURES:   None     LAB RESULTS:      Lab  03/10/23  0459 03/09/23  0608 03/08/23  0526 03/07/23  0552 03/06/23  1114 03/06/23  0603 03/05/23  1452   WBC 9.75 8.34 8.37 8.79 10.06   < >  --    HEMOGLOBIN 12.6 12.2 12.1 12.9 12.9   < >  --    HEMATOCRIT 37.3 36.5 36.7 39.1 39.7   < >  --    PLATELETS 324 334 325 336 352   < >  --    NEUTROS ABS 6.07 4.12 4.42 4.38 6.50   < >  --    IMMATURE GRANS (ABS) 0.03 0.05 0.04 0.04 0.03   < >  --    LYMPHS ABS 1.50 1.78 1.67 2.17 1.73   < >  --    MONOS ABS 1.06* 1.21* 1.14* 1.09* 0.92*   < >  --    EOS ABS 1.01* 1.10* 1.01* 1.03* 0.81*   < >  --    MCV 81.1 81.5 82.3 82.5 83.8   < >  --    LACTATE  --   --   --   --   --   --  1.2    < > = values in this interval not displayed.         Lab 03/10/23  0459 03/09/23  0608 03/08/23  0526 03/07/23  0552 03/06/23  1114 03/06/23  0603 03/05/23  1451   SODIUM 132* 133* 129* 132* 131* 134* 131*   POTASSIUM 3.2* 4.0 3.9 4.1 4.0 3.9 4.0   CHLORIDE 93* 94* 92* 94* 95* 97* 92*   CO2 30.4* 29.3* 31.1* 29.7* 28.1 27.6 29.1*   ANION GAP 8.6 9.7 5.9 8.3 7.9 9.4 9.9   BUN 12 15 19 12 13 13 13   CREATININE 0.47* 0.50* 0.39* 0.47* 0.50* 0.47* 0.54*   EGFR 89.4 88.1 93.6 89.4 88.1 89.4 86.5   GLUCOSE 128* 118* 122* 115* 161* 129* 133*   CALCIUM 8.6 8.7 9.0 8.7 8.7 8.6 8.9   MAGNESIUM  --   --   --   --   --  1.8 1.7         Lab 03/05/23  1451   TOTAL PROTEIN 7.0   ALBUMIN 3.5   GLOBULIN 3.5   ALT (SGPT) 11   AST (SGOT) 17   BILIRUBIN 0.6   ALK PHOS 125*         Lab 03/05/23  1451   PROBNP 384.9   HSTROP T 20*                 Brief Urine Lab Results  (Last result in the past 365 days)      Color   Clarity   Blood   Leuk Est   Nitrite   Protein   CREAT   Urine HCG        03/05/23 1423 Yellow   Turbid   Moderate (2+)   Large (3+)   Positive   Negative               Microbiology Results (last 10 days)     Procedure Component Value - Date/Time    Urine Culture - Urine, Urine, Clean Catch [[id number]]  (Abnormal)  (Susceptibility) Collected: 03/05/23 1423    Lab Status: Final result Specimen:  Urine, Clean Catch Updated: 03/08/23 0548     Urine Culture >100,000 CFU/mL Escherichia coli ESBL     Comment:   Consider infectious disease consult.  Susceptibility results may not correlate to clinical outcomes.       Narrative:      Colonization of the urinary tract without infection is common. Treatment is discouraged unless the patient is symptomatic, pregnant, or undergoing an invasive urologic procedure.  Recent outcomes data supports the use of pip/tazo in the treatment of susceptible ESBL infections for uncomplicated UTI. Consider use of pip/tazo as a carbapenem-sparing regimen in applicable patients.    Susceptibility      Escherichia coli ESBL      SUPRIYA      Ertapenem Susceptible      Gentamicin Susceptible      Levofloxacin Resistant     Meropenem Susceptible      Nitrofurantoin Susceptible      Piperacillin + Tazobactam Susceptible      Trimethoprim + Sulfamethoxazole Susceptible                                 XR Chest 1 View    Result Date: 3/5/2023  Impression:   1. 1. Interval exchange of left subclavian transvenous pacemaker pack.  2. No acute cardiopulmonary disease.  No evidence of pneumothorax.       ZOHRA MORILLO MD       Electronically Signed and Approved By: ZOHRA MORILLO MD on 3/05/2023 at 13:52                           Labs Pending at Discharge:        Time spent on Discharge including face to face service:  More than 35  minutes    Electronically signed by Dimas Oliveros DO, 03/10/23, 10:24 AM EST.

## 2023-03-10 NOTE — PLAN OF CARE
Goal Outcome Evaluation:  Plan of Care Reviewed With: patient        Progress: no change  Outcome Evaluation: Pt. AOX4 this shift. Pt. turned throughout shift. Skin care and Wound care provided per order. Pt. denied any numbness/tingling this shift. SCDs were on but pt. requested to take them off. No new issues/needs noted at this time.

## 2023-03-10 NOTE — PLAN OF CARE
Goal Outcome Evaluation:   Pt to be discharged to [facility] today. EMS called.  Report called to Marlene Restrepo at facility.

## 2023-03-12 LAB — QT INTERVAL: 406 MS

## (undated) DEVICE — SWABSTK SKINPREP PVPI LF PK/3

## (undated) DEVICE — STERILE TOOTHPICK SET: Brand: CENTURION

## (undated) DEVICE — TRAP FLD MINIVAC MEGADYNE 100ML

## (undated) DEVICE — SUT GUT PLN FAST ABS 5/0 PC1 18IN 1915G

## (undated) DEVICE — SPONGE,NEURO,0.5"X3",XR,STRL,LF,10/PK: Brand: MEDLINE

## (undated) DEVICE — ELECTRD BLD EZ CLN MOD 2.5IN

## (undated) DEVICE — CROUCH CORNEAL PROTECTOR: Brand: BAUSCH + LOMB

## (undated) DEVICE — WIPE INST MEROCEL

## (undated) DEVICE — PATIENT RETURN ELECTRODE, SINGLE-USE, CONTACT QUALITY MONITORING, ADULT, WITH 9FT CORD, FOR PATIENTS WEIGING OVER 33LBS. (15KG): Brand: MEGADYNE

## (undated) DEVICE — NDL HYPO PRECISIONGLIDE REG 25G 1 1/2

## (undated) DEVICE — STERILE COTTON TIP 6IN 10PK: Brand: MEDLINE

## (undated) DEVICE — TBG PENCL TELESCP MEGADYNE SMOKE EVAC 10FT

## (undated) DEVICE — PK ENT 40

## (undated) DEVICE — ELECTRD NDL EZ CLN MOD 2.75IN

## (undated) DEVICE — SUT VIC 5/0 P3 18IN J493G

## (undated) DEVICE — GLV SURG BIOGEL SENSR LTX PF SZ7.5